# Patient Record
Sex: MALE | Race: WHITE | NOT HISPANIC OR LATINO | Employment: UNEMPLOYED | URBAN - NONMETROPOLITAN AREA
[De-identification: names, ages, dates, MRNs, and addresses within clinical notes are randomized per-mention and may not be internally consistent; named-entity substitution may affect disease eponyms.]

---

## 2017-08-23 ENCOUNTER — APPOINTMENT (OUTPATIENT)
Dept: GENERAL RADIOLOGY | Facility: HOSPITAL | Age: 37
End: 2017-08-23

## 2017-08-23 ENCOUNTER — HOSPITAL ENCOUNTER (EMERGENCY)
Facility: HOSPITAL | Age: 37
Discharge: HOME | End: 2017-08-23
Attending: EMERGENCY MEDICINE | Admitting: EMERGENCY MEDICINE

## 2017-08-23 VITALS
RESPIRATION RATE: 18 BRPM | BODY MASS INDEX: 18.96 KG/M2 | HEART RATE: 80 BPM | SYSTOLIC BLOOD PRESSURE: 146 MMHG | WEIGHT: 140 LBS | DIASTOLIC BLOOD PRESSURE: 96 MMHG | OXYGEN SATURATION: 97 % | TEMPERATURE: 98.5 F | HEIGHT: 72 IN

## 2017-08-23 DIAGNOSIS — R07.9 CHEST PAIN, UNSPECIFIED TYPE: Primary | ICD-10-CM

## 2017-08-23 DIAGNOSIS — F19.10 DRUG ABUSE (HCC): ICD-10-CM

## 2017-08-23 LAB
ALBUMIN SERPL-MCNC: 4.7 G/DL (ref 3.5–5)
ALBUMIN/GLOB SERPL: 1.5 G/DL (ref 1–2)
ALP SERPL-CCNC: 132 U/L (ref 38–126)
ALT SERPL W P-5'-P-CCNC: 36 U/L (ref 13–69)
AMPHET+METHAMPHET UR QL: POSITIVE
AMPHETAMINES UR QL: NEGATIVE
ANION GAP SERPL CALCULATED.3IONS-SCNC: 16.5 MMOL/L
AST SERPL-CCNC: 24 U/L (ref 15–46)
BARBITURATES UR QL SCN: NEGATIVE
BASOPHILS # BLD AUTO: 0.1 10*3/MM3 (ref 0–0.2)
BASOPHILS NFR BLD AUTO: 1.2 % (ref 0–2.5)
BENZODIAZ UR QL SCN: NEGATIVE
BILIRUB SERPL-MCNC: 0.6 MG/DL (ref 0.2–1.3)
BILIRUB UR QL STRIP: NEGATIVE
BUN BLD-MCNC: 10 MG/DL (ref 7–20)
BUN/CREAT SERPL: 12.5 (ref 6.3–21.9)
BUPRENORPHINE SERPL-MCNC: NEGATIVE NG/ML
CALCIUM SPEC-SCNC: 9.6 MG/DL (ref 8.4–10.2)
CANNABINOIDS SERPL QL: NEGATIVE
CHLORIDE SERPL-SCNC: 104 MMOL/L (ref 98–107)
CLARITY UR: ABNORMAL
CO2 SERPL-SCNC: 28 MMOL/L (ref 26–30)
COCAINE UR QL: NEGATIVE
COLOR UR: YELLOW
CREAT BLD-MCNC: 0.8 MG/DL (ref 0.6–1.3)
DEPRECATED RDW RBC AUTO: 38.2 FL (ref 37–54)
EOSINOPHIL # BLD AUTO: 0.18 10*3/MM3 (ref 0–0.7)
EOSINOPHIL NFR BLD AUTO: 2.1 % (ref 0–7)
ERYTHROCYTE [DISTWIDTH] IN BLOOD BY AUTOMATED COUNT: 12.6 % (ref 11.5–14.5)
GFR SERPL CREATININE-BSD FRML MDRD: 109 ML/MIN/1.73
GLOBULIN UR ELPH-MCNC: 3.2 GM/DL
GLUCOSE BLD-MCNC: 118 MG/DL (ref 74–98)
GLUCOSE UR STRIP-MCNC: NEGATIVE MG/DL
HCT VFR BLD AUTO: 48.8 % (ref 42–52)
HGB BLD-MCNC: 16.9 G/DL (ref 14–18)
HGB UR QL STRIP.AUTO: NEGATIVE
HOLD SPECIMEN: NORMAL
HOLD SPECIMEN: NORMAL
IMM GRANULOCYTES # BLD: 0.02 10*3/MM3 (ref 0–0.06)
IMM GRANULOCYTES NFR BLD: 0.2 % (ref 0–0.6)
KETONES UR QL STRIP: NEGATIVE
LEUKOCYTE ESTERASE UR QL STRIP.AUTO: NEGATIVE
LIPASE SERPL-CCNC: 153 U/L (ref 23–300)
LYMPHOCYTES # BLD AUTO: 1.93 10*3/MM3 (ref 0.6–3.4)
LYMPHOCYTES NFR BLD AUTO: 22.5 % (ref 10–50)
MCH RBC QN AUTO: 28.8 PG (ref 27–31)
MCHC RBC AUTO-ENTMCNC: 34.6 G/DL (ref 30–37)
MCV RBC AUTO: 83.3 FL (ref 80–94)
METHADONE UR QL SCN: NEGATIVE
MONOCYTES # BLD AUTO: 0.49 10*3/MM3 (ref 0–0.9)
MONOCYTES NFR BLD AUTO: 5.7 % (ref 0–12)
NEUTROPHILS # BLD AUTO: 5.87 10*3/MM3 (ref 2–6.9)
NEUTROPHILS NFR BLD AUTO: 68.3 % (ref 37–80)
NITRITE UR QL STRIP: NEGATIVE
NRBC BLD MANUAL-RTO: 0 /100 WBC (ref 0–0)
OPIATES UR QL: NEGATIVE
OXYCODONE UR QL SCN: NEGATIVE
PCP UR QL SCN: NEGATIVE
PH UR STRIP.AUTO: 7.5 [PH] (ref 5–8)
PLATELET # BLD AUTO: 336 10*3/MM3 (ref 130–400)
PMV BLD AUTO: 9.6 FL (ref 6–12)
POTASSIUM BLD-SCNC: 3.5 MMOL/L (ref 3.5–5.1)
PROPOXYPH UR QL: NEGATIVE
PROT SERPL-MCNC: 7.9 G/DL (ref 6.3–8.2)
PROT UR QL STRIP: ABNORMAL
RBC # BLD AUTO: 5.86 10*6/MM3 (ref 4.7–6.1)
SODIUM BLD-SCNC: 145 MMOL/L (ref 137–145)
SP GR UR STRIP: 1.01 (ref 1–1.03)
TRICYCLICS UR QL SCN: NEGATIVE
TROPONIN I SERPL-MCNC: 0 NG/ML (ref 0–0.05)
TROPONIN I SERPL-MCNC: <0.012 NG/ML (ref 0–0.03)
UROBILINOGEN UR QL STRIP: ABNORMAL
WBC NRBC COR # BLD: 8.59 10*3/MM3 (ref 4.8–10.8)
WHOLE BLOOD HOLD SPECIMEN: NORMAL

## 2017-08-23 PROCEDURE — 85025 COMPLETE CBC W/AUTO DIFF WBC: CPT | Performed by: EMERGENCY MEDICINE

## 2017-08-23 PROCEDURE — 99284 EMERGENCY DEPT VISIT MOD MDM: CPT

## 2017-08-23 PROCEDURE — 71010 HC CHEST PA OR AP: CPT

## 2017-08-23 PROCEDURE — 83690 ASSAY OF LIPASE: CPT | Performed by: EMERGENCY MEDICINE

## 2017-08-23 PROCEDURE — 93005 ELECTROCARDIOGRAM TRACING: CPT

## 2017-08-23 PROCEDURE — 80053 COMPREHEN METABOLIC PANEL: CPT | Performed by: EMERGENCY MEDICINE

## 2017-08-23 PROCEDURE — 80074 ACUTE HEPATITIS PANEL: CPT | Performed by: EMERGENCY MEDICINE

## 2017-08-23 PROCEDURE — 84484 ASSAY OF TROPONIN QUANT: CPT | Performed by: EMERGENCY MEDICINE

## 2017-08-23 PROCEDURE — 81003 URINALYSIS AUTO W/O SCOPE: CPT | Performed by: EMERGENCY MEDICINE

## 2017-08-23 PROCEDURE — 80306 DRUG TEST PRSMV INSTRMNT: CPT | Performed by: EMERGENCY MEDICINE

## 2017-08-23 RX ORDER — ASPIRIN 325 MG
325 TABLET ORAL ONCE
Status: COMPLETED | OUTPATIENT
Start: 2017-08-23 | End: 2017-08-23

## 2017-08-23 RX ORDER — SODIUM CHLORIDE 0.9 % (FLUSH) 0.9 %
10 SYRINGE (ML) INJECTION AS NEEDED
Status: DISCONTINUED | OUTPATIENT
Start: 2017-08-23 | End: 2017-08-23 | Stop reason: HOSPADM

## 2017-08-23 RX ORDER — ASPIRIN 81 MG/1
81 TABLET ORAL DAILY
Qty: 30 TABLET | Refills: 0 | Status: SHIPPED | OUTPATIENT
Start: 2017-08-23 | End: 2017-09-22

## 2017-08-23 RX ADMIN — ASPIRIN 325 MG ORAL TABLET 325 MG: 325 PILL ORAL at 13:02

## 2017-08-23 NOTE — ED PROVIDER NOTES
Subjective   History of Present Illness  TRIAGE CHIEF COMPLAINT:   Chief Complaint   Patient presents with   • Chest Pain   • Abdominal Pain         HPI: Mo Miller   is a 37 y.o. male   who presents to the emergency department complaining of chest pain.  Very poor historian.  Patient describes intermittent episodes of left-sided chest pain described as pressure ongoing for the past few months.  Also describes intermittent episodes of right flank discomfort for roughly the same period of time.  He does not believe they are related.  Denies any significant past medical history but his significant other states that he does a lot of drugs especially methamphetamine and also some heroin.  She also states that he was recently using methamphetamine with her son who then wound up in the hospital in the ICU now only has 30% cardiac function and is wearing a life vest at this time pending AICD placement.  Patient states this event may have nervous about his heart and decided to come in and get checked out.  Does not currently have a PCP and denies any known medical conditions.  Currently feeling well with some residual left-sided crampy discomfort.  Denies shortness of breath, nausea, vomiting, diaphoresis, dysuria, hematuria.  Patient also requests testing for hepatitis C.           Review of Systems   All other systems reviewed and are negative.      History reviewed. No pertinent past medical history.    Allergies   Allergen Reactions   • Penicillins Other (See Comments)     Pt unsure of reaction       History reviewed. No pertinent surgical history.    History reviewed. No pertinent family history.    Social History     Social History   • Marital status: Single     Spouse name: N/A   • Number of children: N/A   • Years of education: N/A     Social History Main Topics   • Smoking status: Current Every Day Smoker     Packs/day: 1.50     Types: Cigarettes   • Smokeless tobacco: None   • Alcohol use No   • Drug  use: Yes     Special: IV, Methamphetamines      Comment: heroin and meth   • Sexual activity: Defer     Other Topics Concern   • None     Social History Narrative   • None           Objective   Physical Exam    CONSTITUTIONAL: Awake, oriented, appears thin, older than stated age but non-toxic   HENT: Atraumatic, normocephalic, oral mucosa pink and moist, airway patent. Nares patent without drainage. External ears normal.   EYES: Conjunctiva clear, EOMI, PERRL   NECK: Trachea midline, non-tender, supple   CARDIOVASCULAR: Normal heart rate, Normal rhythm, No murmurs, rubs, gallops   PULMONARY/CHEST: Clear to auscultation, no rhonchi, wheezes, or rales. Symmetrical breath sounds. Non-tender.   ABDOMINAL: Non-distended, soft, non-tender - no rebound or guarding. BS normal.   NEUROLOGIC: Non-focal, moving all four extremities, no gross sensory or motor deficits.   EXTREMITIES: No clubbing, cyanosis, or edema   SKIN: Warm, Dry, No erythema, No rash     XR Chest 1 View   Final Result   No acute abnormality of the chest and no significant   interval change from the prior exam.       This report was finalized on 8/23/2017 1:24 PM by Henry Baker MD.              EKG:  NSR, rate 80, no acute ST changes        Procedures         ED Course  ED Course          ED COURSE / MEDICAL DECISION MAKING:   Nursing notes reviewed.    Patient feeling better and reevaluation.  Symptoms are atypical and workup is unremarkable.  Plan for low-dose aspirin, cardiology referral, encouraged to quit using methamphetamine and other drugs.  Patient requested testing for hepatitis and was advised these results will be available in a few days and that his PCP would need to follow up on those results.    DECISION TO DISCHARGE/ADMIT: see ED care timeline       Electronically signed by: Tyson Archuleta MD, 8/23/2017 3:28 PM              [facility]    Final diagnoses:   Chest pain, unspecified type   Drug abuse            Tyson Archuleta MD  08/23/17  [id number]

## 2017-08-24 LAB
HAV IGM SERPL QL IA: NEGATIVE
HBV CORE IGM SERPL QL IA: NEGATIVE
HBV SURFACE AG SERPL QL IA: NEGATIVE
HCV AB S/CO SERPL IA: >11 S/CO RATIO (ref 0–0.9)

## 2017-08-24 NOTE — ED NOTES
Pt comes to ED, states he was called and to to come for an abnormal lab. ARPITA Bunch states she didn't need pt to come to ED, she just wanted to give him results that he was Hep C +. I spoke with pt and gave him a list of PCP's in town for him to follow up for long term care and monitoring.     Jhon Carver RN  08/24/17 [id number]

## 2022-10-18 ENCOUNTER — HOSPITAL ENCOUNTER (INPATIENT)
Facility: HOSPITAL | Age: 42
LOS: 3 days | Discharge: HOME | End: 2022-10-21
Attending: PSYCHIATRY & NEUROLOGY | Admitting: PSYCHIATRY & NEUROLOGY

## 2022-10-18 ENCOUNTER — HOSPITAL ENCOUNTER (EMERGENCY)
Facility: HOSPITAL | Age: 42
Discharge: PSYCHIATRIC OR SUBSTANCE USE FACILITY | End: 2022-10-18
Attending: STUDENT IN AN ORGANIZED HEALTH CARE EDUCATION/TRAINING PROGRAM | Admitting: STUDENT IN AN ORGANIZED HEALTH CARE EDUCATION/TRAINING PROGRAM

## 2022-10-18 VITALS
BODY MASS INDEX: 21.25 KG/M2 | DIASTOLIC BLOOD PRESSURE: 98 MMHG | OXYGEN SATURATION: 99 % | HEIGHT: 77 IN | TEMPERATURE: 97.8 F | RESPIRATION RATE: 18 BRPM | HEART RATE: 103 BPM | WEIGHT: 180 LBS | SYSTOLIC BLOOD PRESSURE: 158 MMHG

## 2022-10-18 DIAGNOSIS — F29 PSYCHOSIS, UNSPECIFIED PSYCHOSIS TYPE: Primary | ICD-10-CM

## 2022-10-18 DIAGNOSIS — F19.10 SUBSTANCE ABUSE: ICD-10-CM

## 2022-10-18 PROBLEM — R41.82 ALTERED MENTAL STATUS: Status: ACTIVE | Noted: 2022-10-18

## 2022-10-18 LAB
ALBUMIN SERPL-MCNC: 4.74 G/DL (ref 3.5–5.2)
ALBUMIN/GLOB SERPL: 1.6 G/DL
ALP SERPL-CCNC: 161 U/L (ref 39–117)
ALT SERPL W P-5'-P-CCNC: 38 U/L (ref 1–41)
AMPHET+METHAMPHET UR QL: POSITIVE
AMPHETAMINES UR QL: POSITIVE
ANION GAP SERPL CALCULATED.3IONS-SCNC: 15 MMOL/L (ref 5–15)
AST SERPL-CCNC: 108 U/L (ref 1–40)
BACTERIA UR QL AUTO: ABNORMAL /HPF
BARBITURATES UR QL SCN: NEGATIVE
BASOPHILS # BLD AUTO: 0.11 10*3/MM3 (ref 0–0.2)
BASOPHILS NFR BLD AUTO: 0.7 % (ref 0–1.5)
BENZODIAZ UR QL SCN: NEGATIVE
BILIRUB SERPL-MCNC: 0.8 MG/DL (ref 0–1.2)
BILIRUB UR QL STRIP: ABNORMAL
BUN SERPL-MCNC: 17 MG/DL (ref 6–20)
BUN/CREAT SERPL: 16.8 (ref 7–25)
BUPRENORPHINE SERPL-MCNC: NEGATIVE NG/ML
CALCIUM SPEC-SCNC: 9.5 MG/DL (ref 8.6–10.5)
CANNABINOIDS SERPL QL: NEGATIVE
CHLORIDE SERPL-SCNC: 98 MMOL/L (ref 98–107)
CLARITY UR: CLEAR
CO2 SERPL-SCNC: 22 MMOL/L (ref 22–29)
COCAINE UR QL: NEGATIVE
COLOR UR: ABNORMAL
CREAT SERPL-MCNC: 1.01 MG/DL (ref 0.76–1.27)
DEPRECATED RDW RBC AUTO: 37.6 FL (ref 37–54)
EGFRCR SERPLBLD CKD-EPI 2021: 95.8 ML/MIN/1.73
EOSINOPHIL # BLD AUTO: 0.01 10*3/MM3 (ref 0–0.4)
EOSINOPHIL NFR BLD AUTO: 0.1 % (ref 0.3–6.2)
ERYTHROCYTE [DISTWIDTH] IN BLOOD BY AUTOMATED COUNT: 12.5 % (ref 12.3–15.4)
ETHANOL BLD-MCNC: <10 MG/DL (ref 0–10)
ETHANOL UR QL: <0.01 %
FLUAV RNA RESP QL NAA+PROBE: NOT DETECTED
FLUBV RNA RESP QL NAA+PROBE: NOT DETECTED
GLOBULIN UR ELPH-MCNC: 3 GM/DL
GLUCOSE SERPL-MCNC: 121 MG/DL (ref 65–99)
GLUCOSE UR STRIP-MCNC: NEGATIVE MG/DL
HCT VFR BLD AUTO: 50.7 % (ref 37.5–51)
HGB BLD-MCNC: 17.6 G/DL (ref 13–17.7)
HGB UR QL STRIP.AUTO: ABNORMAL
HYALINE CASTS UR QL AUTO: ABNORMAL /LPF
IMM GRANULOCYTES # BLD AUTO: 0.07 10*3/MM3 (ref 0–0.05)
IMM GRANULOCYTES NFR BLD AUTO: 0.5 % (ref 0–0.5)
KETONES UR QL STRIP: ABNORMAL
LEUKOCYTE ESTERASE UR QL STRIP.AUTO: ABNORMAL
LYMPHOCYTES # BLD AUTO: 2.07 10*3/MM3 (ref 0.7–3.1)
LYMPHOCYTES NFR BLD AUTO: 14 % (ref 19.6–45.3)
MAGNESIUM SERPL-MCNC: 1.9 MG/DL (ref 1.6–2.6)
MCH RBC QN AUTO: 28.8 PG (ref 26.6–33)
MCHC RBC AUTO-ENTMCNC: 34.7 G/DL (ref 31.5–35.7)
MCV RBC AUTO: 82.8 FL (ref 79–97)
METHADONE UR QL SCN: NEGATIVE
MONOCYTES # BLD AUTO: 0.97 10*3/MM3 (ref 0.1–0.9)
MONOCYTES NFR BLD AUTO: 6.5 % (ref 5–12)
NEUTROPHILS NFR BLD AUTO: 11.6 10*3/MM3 (ref 1.7–7)
NEUTROPHILS NFR BLD AUTO: 78.2 % (ref 42.7–76)
NITRITE UR QL STRIP: NEGATIVE
NRBC BLD AUTO-RTO: 0 /100 WBC (ref 0–0.2)
OPIATES UR QL: NEGATIVE
OXYCODONE UR QL SCN: NEGATIVE
PCP UR QL SCN: NEGATIVE
PH UR STRIP.AUTO: <=5 [PH] (ref 5–8)
PLATELET # BLD AUTO: 413 10*3/MM3 (ref 140–450)
PMV BLD AUTO: 8.6 FL (ref 6–12)
POTASSIUM SERPL-SCNC: 4 MMOL/L (ref 3.5–5.2)
PROPOXYPH UR QL: NEGATIVE
PROT SERPL-MCNC: 7.7 G/DL (ref 6–8.5)
PROT UR QL STRIP: ABNORMAL
RBC # BLD AUTO: 6.12 10*6/MM3 (ref 4.14–5.8)
RBC # UR STRIP: ABNORMAL /HPF
REF LAB TEST METHOD: ABNORMAL
SARS-COV-2 RNA RESP QL NAA+PROBE: NOT DETECTED
SODIUM SERPL-SCNC: 135 MMOL/L (ref 136–145)
SP GR UR STRIP: >1.03 (ref 1–1.03)
SQUAMOUS #/AREA URNS HPF: ABNORMAL /HPF
TRICYCLICS UR QL SCN: NEGATIVE
UROBILINOGEN UR QL STRIP: ABNORMAL
WBC # UR STRIP: ABNORMAL /HPF
WBC NRBC COR # BLD: 14.83 10*3/MM3 (ref 3.4–10.8)

## 2022-10-18 PROCEDURE — C9803 HOPD COVID-19 SPEC COLLECT: HCPCS

## 2022-10-18 PROCEDURE — 25010000002 LORAZEPAM PER 2 MG: Performed by: STUDENT IN AN ORGANIZED HEALTH CARE EDUCATION/TRAINING PROGRAM

## 2022-10-18 PROCEDURE — 85025 COMPLETE CBC W/AUTO DIFF WBC: CPT | Performed by: EMERGENCY MEDICINE

## 2022-10-18 PROCEDURE — 83735 ASSAY OF MAGNESIUM: CPT | Performed by: EMERGENCY MEDICINE

## 2022-10-18 PROCEDURE — 36415 COLL VENOUS BLD VENIPUNCTURE: CPT

## 2022-10-18 PROCEDURE — 80053 COMPREHEN METABOLIC PANEL: CPT | Performed by: EMERGENCY MEDICINE

## 2022-10-18 PROCEDURE — 81001 URINALYSIS AUTO W/SCOPE: CPT | Performed by: EMERGENCY MEDICINE

## 2022-10-18 PROCEDURE — 82077 ASSAY SPEC XCP UR&BREATH IA: CPT | Performed by: EMERGENCY MEDICINE

## 2022-10-18 PROCEDURE — 25010000002 DIPHENHYDRAMINE PER 50 MG: Performed by: PHYSICIAN ASSISTANT

## 2022-10-18 PROCEDURE — 96372 THER/PROPH/DIAG INJ SC/IM: CPT

## 2022-10-18 PROCEDURE — 87636 SARSCOV2 & INF A&B AMP PRB: CPT | Performed by: EMERGENCY MEDICINE

## 2022-10-18 PROCEDURE — 80306 DRUG TEST PRSMV INSTRMNT: CPT | Performed by: EMERGENCY MEDICINE

## 2022-10-18 PROCEDURE — 99284 EMERGENCY DEPT VISIT MOD MDM: CPT

## 2022-10-18 PROCEDURE — 25010000002 HALOPERIDOL LACTATE PER 5 MG: Performed by: PHYSICIAN ASSISTANT

## 2022-10-18 RX ORDER — TRAZODONE HYDROCHLORIDE 50 MG/1
50 TABLET ORAL NIGHTLY PRN
Status: DISCONTINUED | OUTPATIENT
Start: 2022-10-18 | End: 2022-10-21 | Stop reason: HOSPADM

## 2022-10-18 RX ORDER — BENZONATATE 100 MG/1
100 CAPSULE ORAL 3 TIMES DAILY PRN
Status: DISCONTINUED | OUTPATIENT
Start: 2022-10-18 | End: 2022-10-21 | Stop reason: HOSPADM

## 2022-10-18 RX ORDER — NICOTINE 21 MG/24HR
1 PATCH, TRANSDERMAL 24 HOURS TRANSDERMAL
Status: DISCONTINUED | OUTPATIENT
Start: 2022-10-18 | End: 2022-10-21 | Stop reason: HOSPADM

## 2022-10-18 RX ORDER — OLANZAPINE 5 MG/1
10 TABLET, ORALLY DISINTEGRATING ORAL NIGHTLY
Status: ACTIVE | OUTPATIENT
Start: 2022-10-18 | End: 2022-10-19

## 2022-10-18 RX ORDER — HYDROXYZINE 50 MG/1
50 TABLET, FILM COATED ORAL EVERY 6 HOURS PRN
Status: DISCONTINUED | OUTPATIENT
Start: 2022-10-18 | End: 2022-10-21 | Stop reason: HOSPADM

## 2022-10-18 RX ORDER — ONDANSETRON 4 MG/1
4 TABLET, FILM COATED ORAL EVERY 6 HOURS PRN
Status: DISCONTINUED | OUTPATIENT
Start: 2022-10-18 | End: 2022-10-21 | Stop reason: HOSPADM

## 2022-10-18 RX ORDER — LORAZEPAM 2 MG/ML
2 INJECTION INTRAMUSCULAR ONCE
Status: COMPLETED | OUTPATIENT
Start: 2022-10-18 | End: 2022-10-18

## 2022-10-18 RX ORDER — BENZTROPINE MESYLATE 1 MG/ML
1 INJECTION INTRAMUSCULAR; INTRAVENOUS ONCE AS NEEDED
Status: DISCONTINUED | OUTPATIENT
Start: 2022-10-18 | End: 2022-10-21 | Stop reason: HOSPADM

## 2022-10-18 RX ORDER — BENZTROPINE MESYLATE 1 MG/1
2 TABLET ORAL ONCE AS NEEDED
Status: DISCONTINUED | OUTPATIENT
Start: 2022-10-18 | End: 2022-10-21 | Stop reason: HOSPADM

## 2022-10-18 RX ORDER — IBUPROFEN 400 MG/1
400 TABLET ORAL EVERY 6 HOURS PRN
Status: DISCONTINUED | OUTPATIENT
Start: 2022-10-18 | End: 2022-10-21 | Stop reason: HOSPADM

## 2022-10-18 RX ORDER — DIPHENHYDRAMINE HYDROCHLORIDE 50 MG/ML
50 INJECTION INTRAMUSCULAR; INTRAVENOUS ONCE
Status: COMPLETED | OUTPATIENT
Start: 2022-10-18 | End: 2022-10-18

## 2022-10-18 RX ORDER — ALUMINA, MAGNESIA, AND SIMETHICONE 2400; 2400; 240 MG/30ML; MG/30ML; MG/30ML
15 SUSPENSION ORAL EVERY 6 HOURS PRN
Status: DISCONTINUED | OUTPATIENT
Start: 2022-10-18 | End: 2022-10-21 | Stop reason: HOSPADM

## 2022-10-18 RX ORDER — ECHINACEA PURPUREA EXTRACT 125 MG
2 TABLET ORAL AS NEEDED
Status: DISCONTINUED | OUTPATIENT
Start: 2022-10-18 | End: 2022-10-21 | Stop reason: HOSPADM

## 2022-10-18 RX ORDER — FAMOTIDINE 20 MG/1
20 TABLET, FILM COATED ORAL 2 TIMES DAILY PRN
Status: DISCONTINUED | OUTPATIENT
Start: 2022-10-18 | End: 2022-10-21 | Stop reason: HOSPADM

## 2022-10-18 RX ORDER — LOPERAMIDE HYDROCHLORIDE 2 MG/1
2 CAPSULE ORAL
Status: DISCONTINUED | OUTPATIENT
Start: 2022-10-18 | End: 2022-10-21 | Stop reason: HOSPADM

## 2022-10-18 RX ORDER — HALOPERIDOL 5 MG/ML
5 INJECTION INTRAMUSCULAR ONCE
Status: COMPLETED | OUTPATIENT
Start: 2022-10-18 | End: 2022-10-18

## 2022-10-18 RX ADMIN — DIPHENHYDRAMINE HYDROCHLORIDE 50 MG: 50 INJECTION, SOLUTION INTRAMUSCULAR; INTRAVENOUS at 18:18

## 2022-10-18 RX ADMIN — LORAZEPAM 2 MG: 2 INJECTION INTRAMUSCULAR; INTRAVENOUS at 18:18

## 2022-10-18 RX ADMIN — HALOPERIDOL LACTATE 5 MG: 5 INJECTION, SOLUTION INTRAMUSCULAR at 18:18

## 2022-10-18 NOTE — NURSING NOTE
Spoke with Dr. Fuller, discussed assessment and labs, new orders to admit the patient to A&E on a 72 hour hold once labs are resulted and acceptable per protocol.  Request a B52 now while in the ED, Zyprexa 10 mg PO tonight at bedtime.  [id number]
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
## 2022-10-18 NOTE — NURSING NOTE
"Pt presents to intake with sister, Veda Miller, [phone].      Pt states he is here because \"he is high\", states he snorted a line today.  Pt denies any other substance abuse, pt stands up during the assessment and states \"I dont need admitted, I just need this meth to wear off\". Pt denies wanting detox or needing admission. Pt also denies having an addiction to any substance at this time. Pt has moments of clarity and moments of confusion, as well as moments of paranoia.     Sister states the patient has been using Meth and has not slept for 4 nights and has not eaten or drank anything to her knowledge.  Pt presents in a stupor and is confused, is disoriented to time and situation, but is oriented to person and place.     Pt denies SI/HI    Pt states he is hearing voices but doesn't respond when asked about other hallucinations.    Pt rates depression and anxiety 0/10 at this time  "
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
## 2022-10-19 PROBLEM — F15.20 METHAMPHETAMINE USE DISORDER, SEVERE: Status: ACTIVE | Noted: 2022-10-19

## 2022-10-19 PROBLEM — F19.959 PSYCHOACTIVE SUBSTANCE-INDUCED PSYCHOSIS: Status: ACTIVE | Noted: 2022-10-18

## 2022-10-19 PROBLEM — F17.200 NICOTINE USE DISORDER: Status: ACTIVE | Noted: 2022-10-19

## 2022-10-19 LAB
QT INTERVAL: 370 MS
QTC INTERVAL: 424 MS

## 2022-10-19 PROCEDURE — 99223 1ST HOSP IP/OBS HIGH 75: CPT | Performed by: PSYCHIATRY & NEUROLOGY

## 2022-10-19 PROCEDURE — 93010 ELECTROCARDIOGRAM REPORT: CPT | Performed by: INTERNAL MEDICINE

## 2022-10-19 PROCEDURE — 93005 ELECTROCARDIOGRAM TRACING: CPT | Performed by: PSYCHIATRY & NEUROLOGY

## 2022-10-20 PROCEDURE — 99232 SBSQ HOSP IP/OBS MODERATE 35: CPT | Performed by: PSYCHIATRY & NEUROLOGY

## 2022-10-21 VITALS
SYSTOLIC BLOOD PRESSURE: 135 MMHG | WEIGHT: 180 LBS | HEIGHT: 72 IN | RESPIRATION RATE: 18 BRPM | BODY MASS INDEX: 24.38 KG/M2 | TEMPERATURE: 97.3 F | HEART RATE: 65 BPM | OXYGEN SATURATION: 98 % | DIASTOLIC BLOOD PRESSURE: 88 MMHG

## 2022-10-21 PROCEDURE — 99238 HOSP IP/OBS DSCHRG MGMT 30/<: CPT | Performed by: PSYCHIATRY & NEUROLOGY

## 2023-02-01 ENCOUNTER — OFFICE VISIT (OUTPATIENT)
Dept: PSYCHIATRY | Facility: CLINIC | Age: 43
End: 2023-02-01
Payer: COMMERCIAL

## 2023-02-01 VITALS
SYSTOLIC BLOOD PRESSURE: 144 MMHG | WEIGHT: 165 LBS | HEART RATE: 87 BPM | HEIGHT: 72 IN | BODY MASS INDEX: 22.35 KG/M2 | DIASTOLIC BLOOD PRESSURE: 88 MMHG

## 2023-02-01 DIAGNOSIS — F15.10 METHAMPHETAMINE USE: Primary | ICD-10-CM

## 2023-02-01 PROCEDURE — 90792 PSYCH DIAG EVAL W/MED SRVCS: CPT | Performed by: NURSE PRACTITIONER

## 2023-02-01 NOTE — PROGRESS NOTES
"Chief Complaint  Drug Problem      Subjective          Mo Miller presents to [facility] for court ordered initial evaluation.    History of Present Illness: Patient reports she presents today as a referral from the [place] court system for initial evaluation.  Patient says \"I am court ordered to be here\".  He says he has a very long history of extensive substance use issues, most recently methamphetamine abuse.  He says he has participated in multiple drug rehabilitation programs.  He says he has done both inpatient and outpatient, as well as IOP programs.  Patient says \"it is hard to quit something until you are ready\".  He says today he has been ready for a couple months.  His most recent arrest was shortly before Christmas.  He says he has not used since then.  Patient says he feels he is doing much better since he has stopped using methamphetamine.  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  Patient says outside of times he has been using illicit substances, he has never struggled with any psychiatric disorders.  He denies any mood or anxiety related issues when he is sober.  Patient says when he does drugs, is when he has issues.  Patient says he is feeling good today, and has no issues or complaints.  He reports he sleeps and eats well, and denies issues with either.  Patient denies any SI/HI, A/V hallucinations.    Past Psychiatric History: Patient has 1 prior psychiatric hospitalization, at [facility] in October 2022 secondary to meth induced psychosis.  Patient reports he did not want to take any medication, and was not prescribed anything upon his release.  He denies any history of \"serious\" suicide attempts.  He does report 1 time when he was under the influence of methamphetamine he cut his wrists, but says \"it was a cry for help\".  Patient says he has never truly wanted to harm himself or " "wanted to end his life.  He denies any history of psychiatric medications.    Substance Use/Abuse: Patient has an extensive substance use history, that primarily consisted of methamphetamine abuse.  Patient reports he only did illicit drugs \"to get high\".  He says he was never masking any underlying issues or trying to self medicate any problems.  He reports he has been clean from all substances for approximately 1.5 months.  He does drink alcohol socially, but not very often.  He has a 1.5 pack/day smoker.  Patient reports he started using illicit substances around the age of 13, when he started smoking cannabis.    Past Medical/Developmental History: Patient denies any known past medical or surgical history.  He denies any known developmental delay history.  Patient admits he has not been to a medical provider in a very long time.    Family Psychiatric History: Patient denies any known family psychiatric history.    Social History: Patient is originally from [place], [place] but grew up in the foster care system in [place].  Patient says he was in foster care from first grade until his sister got custody of him when he was 18 years old.  He did not graduate high school or get a GED.  Patient reports he continued to use illicit substances after going to live with his sister, which he reports caused issues with their relationship over the years.  His parents were , but he says they are now both .  He says he was not very close with either of them growing up in foster care.  He has 9 siblings, but says they are all half siblings.  He says he is somewhat close with his older sister that had custody of him.  He currently works at [employer] as a .  He has been  once, and says they were  greater than 15 years, but that they were only together for between 4 and 5 months.  He has 2 children, an 11-year-old son and an 18-year-old daughter and says he is close with " "both of them.  He reports they both live with their mother.      Current Medications:   No current outpatient medications on file.     No current facility-administered medications for this visit.       Mental Status Exam:   Hygiene:   good  Cooperation:  Cooperative  Eye Contact:  Good  Psychomotor Behavior:  Restless  Affect:  Appropriate  Mood: euthymic  Speech:  Normal  Thought Process:  Goal directed  Thought Content:  Mood congruent  Suicidal:  None  Homicidal:  None  Hallucinations:  None  Delusion:  None  Memory:  Intact  Orientation:  Person, Place, Time and Situation  Reliability:  fair  Insight:  Good  Judgement:  Fair  Impulse Control:  Fair  Physical/Medical Issues:  No      Objective   Vital Signs:   /88   Pulse 87   Ht 182.9 cm (72\")   Wt 74.8 kg (165 lb)   BMI 22.38 kg/m²     Physical Exam  Neurological:      Mental Status: He is oriented to person, place, and time. Mental status is at baseline.      Coordination: Coordination is intact.      Gait: Gait is intact.   Psychiatric:         Behavior: Behavior is cooperative.        Result Review :                   Assessment and Plan    Diagnoses and all orders for this visit:    1. Methamphetamine use (HCC) (Primary)         PHQ-9 Score:   PHQ-9 Total Score: 4      Depression Screening:  Patient screened positive for depression based on a PHQ-9 score of  on . Follow-up recommendations include: Suicide Risk Assessment performed.        Tobacco Cessation:  Mo Miller  reports that he has been smoking cigarettes. He has been smoking an average of 1.5 packs per day. He does not have any smokeless tobacco history on file.. I have educated him on the risk of diseases from using tobacco products such as cancer, COPD and heart disease.     I advised him to quit and he is not willing to quit.    I spent 3  minutes counseling the patient.           Impression/Plan:  -This my initial interaction with the patient.  Patient presents today as a " merlene, 42-year-old, , biological male.  Patient has an extensive history of substance use, especially methamphetamine.  He reports he has been clean since his arrest shortly before Christmas, and says he believes he finally reached a point he was ready to stop.  Patient says he is here today because he was court ordered to have an evaluation.  Patient reports today that he is feeling well, and denies any mental health concerns or complaints.  He says his mood is good, and denies any dysfunction or depression.  He denies any significant anxiety.  Patient reports he is eating and sleeping well, and denies issues with either of those.  Patient has never taken a psychiatric medication in the past, and reports he does not feel he is in need of any today.  -Patient's MICHELE report reviewed and deemed appropriate.  Patient counseled on use of controlled substances.  -Reviewed available lab work.  -Advised patient he can return as needed..    MEDS ORDERED DURING VISIT:  No orders of the defined types were placed in this encounter.        Follow Up   Return if symptoms worsen or fail to improve.  Patient was given instructions and counseling regarding his condition or for health maintenance advice. Please see specific information pulled into the AVS if appropriate.       TREATMENT PLAN/GOALS: Continue supportive psychotherapy efforts and medications as indicated. Treatment and medication options discussed during today's visit. Patient acknowledged and verbally consented to continue with current treatment plan and was educated on the importance of compliance with treatment and follow-up appointments.    MEDICATION ISSUES:  Discussed medication options and treatment plan of prescribed medication as well as the risks, benefits, and side effects including potential falls, possible impaired driving and metabolic adversities among others. Patient is agreeable to call the office with any worsening of symptoms or onset  of side effects. Patient is agreeable to call 911 or go to the nearest ER should he/she begin having SI/HI.            This document has been electronically signed by ELI Merino, PMHNP-BC  February 2, 2023 19:45 EST      Part of this note may be an electronic transcription/translation of spoken language to printed text using the Dragon Dictation System.

## 2023-02-20 ENCOUNTER — OFFICE VISIT (OUTPATIENT)
Dept: PSYCHIATRY | Facility: CLINIC | Age: 43
End: 2023-02-20
Payer: COMMERCIAL

## 2023-02-20 ENCOUNTER — LAB (OUTPATIENT)
Dept: LAB | Facility: HOSPITAL | Age: 43
End: 2023-02-20
Payer: COMMERCIAL

## 2023-02-20 VITALS
SYSTOLIC BLOOD PRESSURE: 142 MMHG | HEART RATE: 68 BPM | BODY MASS INDEX: 21.94 KG/M2 | WEIGHT: 162 LBS | HEIGHT: 72 IN | DIASTOLIC BLOOD PRESSURE: 88 MMHG

## 2023-02-20 DIAGNOSIS — R44.0 AUDITORY HALLUCINATIONS: ICD-10-CM

## 2023-02-20 DIAGNOSIS — F11.21 OPIOID USE DISORDER, SEVERE, IN SUSTAINED REMISSION: ICD-10-CM

## 2023-02-20 DIAGNOSIS — F15.21 METHAMPHETAMINE USE DISORDER, SEVERE, IN EARLY REMISSION: ICD-10-CM

## 2023-02-20 DIAGNOSIS — B18.2 CHRONIC HEPATITIS C WITHOUT HEPATIC COMA: ICD-10-CM

## 2023-02-20 DIAGNOSIS — F41.1 GAD (GENERALIZED ANXIETY DISORDER): ICD-10-CM

## 2023-02-20 DIAGNOSIS — F15.21 METHAMPHETAMINE USE DISORDER, SEVERE, IN EARLY REMISSION: Primary | ICD-10-CM

## 2023-02-20 LAB
AMPHET+METHAMPHET UR QL: NEGATIVE
AMPHETAMINES UR QL: NEGATIVE
BARBITURATES UR QL SCN: NEGATIVE
BENZODIAZ UR QL SCN: NEGATIVE
BUPRENORPHINE SERPL-MCNC: NEGATIVE NG/ML
CANNABINOIDS SERPL QL: NEGATIVE
COCAINE UR QL: NEGATIVE
HIV1 P24 AG SER QL: NORMAL
HIV1+2 AB SER QL: NORMAL
METHADONE UR QL SCN: NEGATIVE
OPIATES UR QL: NEGATIVE
OXYCODONE UR QL SCN: NEGATIVE
PCP UR QL SCN: NEGATIVE
PROPOXYPH UR QL: NEGATIVE
TRICYCLICS UR QL SCN: NEGATIVE

## 2023-02-20 PROCEDURE — 80074 ACUTE HEPATITIS PANEL: CPT

## 2023-02-20 PROCEDURE — 84443 ASSAY THYROID STIM HORMONE: CPT

## 2023-02-20 PROCEDURE — 85025 COMPLETE CBC W/AUTO DIFF WBC: CPT

## 2023-02-20 PROCEDURE — 90792 PSYCH DIAG EVAL W/MED SRVCS: CPT | Performed by: NURSE PRACTITIONER

## 2023-02-20 PROCEDURE — 80053 COMPREHEN METABOLIC PANEL: CPT

## 2023-02-20 PROCEDURE — 80306 DRUG TEST PRSMV INSTRMNT: CPT

## 2023-02-20 PROCEDURE — G0475 HIV COMBINATION ASSAY: HCPCS

## 2023-02-20 PROCEDURE — 87522 HEPATITIS C REVRS TRNSCRPJ: CPT

## 2023-02-20 PROCEDURE — 36415 COLL VENOUS BLD VENIPUNCTURE: CPT

## 2023-02-20 PROCEDURE — 86592 SYPHILIS TEST NON-TREP QUAL: CPT

## 2023-02-20 RX ORDER — QUETIAPINE FUMARATE 25 MG/1
25 TABLET, FILM COATED ORAL NIGHTLY
Qty: 30 TABLET | Refills: 0 | Status: SHIPPED | OUTPATIENT
Start: 2023-02-20 | End: 2023-03-22 | Stop reason: SDUPTHER

## 2023-02-20 NOTE — PROGRESS NOTES
New Patient Office Visit        Patient Name: Mo Miller  : 1980   MRN: [id number]     Referring Provider: Provider, No Known    Chief Complaint: Substance use    History of Present Illness:   Mo Miller is a 42 y.o. male who is here today for court ordered initial evaluation with provider related to substance use.  Somewhat of a poor historian in regards to timeline.  Initial substance in middle school with alcohol and marijuana.  Marijuana use slowly increased over time.  Used daily for quite some time with no intake in 1 to 2 years.  Alcohol use is social and currently seldom.  Last intake with 1 shot on New Year's Josefa.  In high school, recreational use of opiate pain medication began.  Initially started using once or twice a month and gradually progressed to 2-3 times weekly in his 30s.  Does admit to using heroin and fentanyl on occasion with no use in of either in greater than 5 years.  Methamphetamine use began in his 30s as he tried to stop opiate use.  Use was initially 2 to 3 days a week but he found it too activating and typically used 1 or 2 times a month.  Last use 10/31/2022 after arrest for PI. Has occasional, mild cravings for marijuana typically triggered by smelling it. Reports intermittent cocaine use with last intake >10 years ago. Previous B[facility]RA treatment includes 6-month inpatient residential care in [place] (), completed 6 month IOP program at [facility] (), and detox at [facility] .  Not currently attending any meetings or engaging with sober support. Reviewed DSM-V criteria with patient and meets requirements for opioid use disorder, severe, in sustained remission and methamphetamine use disorder, severe, in early remission.     Currently lives in [place] with his sister.  Has two children (11 year old son, 18 year old daughter) that live with their mother. Has son every other weekend but admits relationship with daughter is strained due to him  "being in active addiction most of her childhood.  Identifies sober support system of his sister and children. Currently employed full-time as a  by [employer]. License is not active and does not have a vehicle. Current legal issues public intoxication with methamphetamine from 10/31/2022.  Has been on parole for drug-related charges in the past.  Motivated to change as \"I don't want this to be drawn out a long time”.     Denies any psych history. Did have recent evaluation by PMHNP in our office but admits he was not forthcoming with symptoms.  Today reports symptoms of constant restlessness, sleeping too much or too little, racing thoughts, feelings of guilt, lack of motivation, feeling overwhelmed at times, nervousness, feeling on edge, and trouble relaxing.  Reports poor appetite overall but this is baseline for him.  No recent weight loss. Has difficulty falling asleep about twice a week. Will get about 3-4 hours and does not wake feeling rested. Symptoms have been present since childhood.  Raised in foster care from 1st grade until sister gained custody when he was 18 years old. Has 9 half siblings. Reports today he has been having auditory hallucinations almost daily for the last 2 to 3 years that have worsened his anxiety tremendously.  This is the first time he has admitted it to a healthcare provider.  Voices are constant chatter that have persisted with sobriety.  Most of the time he cannot tell what they are saying. Sometimes they are critical of what he is doing but mostly it is just a running commentary on what he is doing. Denies any command or menacing hallucinations.  Denies visual hallucinations.  No prior psychiatric treatment, individual counseling (other than for BUSHRA). Has never had any pharmacological interventions for mood or psychosis. Denies prior psychiatric hospitalizations. Denies SI/H. +FH of AUD in father. +trauma hx. PMH includes chronic hep C diagnosed " several years ago. LFTs elevated 2022. No prior treatment. Agreeable to referral to Hep C clinic. Currently does not have PCP.    Triggers: smelling marijuana    Cravings: seldom for marijuana, denies cravings for any other substance    Relapse Prevention: Agreeable to recovery meetings 3 to 4 days a week encouraged, peer support, individual therapy bi-weekly with regular UDS    Urine Drug Screen (today's visit) discussed: Ordered    UDS Confirmation: N/A    MICHELE (PDMP) Reviewed for Current/Active Medications: No active medication    DSM 5 Substance Use Disorder Checklist     Diagnostic Criteria   (Substance use disorder requires at least 2 criteria be met within 12 month period)   Meets Criteria          Yes / No  Notes/Supporting Information    1. Substance often taken in larger amounts or over a longer period than intended.  yes  opiates and methamphetamine   2.  There is a persistent desire or unsuccessful effort to cut        down or control th substance use.  no    3.  A great deal of time is spent in activities necessary to        obtain the substance, use the substance, or recover        from its effects.  yes  opiates and methamphetamine   4.  Craving or a strong desire to use the substance.  yes  opiates and methamphetamine   5.  Recurrent substance use resulting in failure to fulfill        major role obligations at work, school, or home.  yes  opiates and methamphetamine   6.  Continued substance use despite having persistent or         recurrent social or interpersonal problems caused or         exacerbated by the effects of the substance.  yes  opiates and methamphetamine   7.   Important social, occupational or recreational activities        are given up or reduced because of substance use.  yes  opiates and methamphetamine   8.   Recurrent substance use in situations in which it is        physically hazardous.  yes  opiates and methamphetamine   9.  Continued use despite knowledge of having a          persistent or recurrent physical or psychological         problem that is likely to have been caused or        exacerbated by the substance.  yes  opiates and methamphetamine   10. * Tolerance, as defined by either of the following:          a. A need for markedly increased amounts of the              Substance to achieve intoxication or desired effect.          b. Markedly diminished effect with continued use of              The same substance.  yes  opiates only   11.  * Withdrawal, as manifested by either of the following:         a. The characteristic withdrawal for the substance.          b. The same (or closely related) substance is taken to               Relieve or avoid withdrawal symptoms.  yes  opiates only   **This criterion is not considered to be met for those individuals taking prescriptions opiates solely under medical supervision. **   Severity: Mild: 2-3 symptoms, Moderate: 4-5 symptoms, Severe: 6 or more symptoms.          Past Surgical History:  History reviewed. No pertinent surgical history.    Problem List:  Patient Active Problem List   Diagnosis   • Psychoactive substance-induced psychosis (HCC)   • Methamphetamine use disorder, severe (HCC)   • Nicotine use disorder       Allergy:   Allergies   Allergen Reactions   • Penicillins Other (See Comments)     Pt unsure of reaction        Current Medications:   Current Outpatient Medications   Medication Sig Dispense Refill   • QUEtiapine (SEROquel) 25 MG tablet Take 1 tablet by mouth Every Night. 30 tablet 0     No current facility-administered medications for this visit.       Past Medical History:  Past Medical History:   Diagnosis Date   • Psychosis (HCC)    • Substance abuse (HCC)        Social History:  Social History     Socioeconomic History   • Marital status: Single   Tobacco Use   • Smoking status: Every Day     Packs/day: 1.00     Types: Cigarettes   Vaping Use   • Vaping Use: Never used   Substance and Sexual Activity   • Alcohol  use: Yes     Comment: rarely   • Drug use: Not Currently     Types: IV, Methamphetamines     Comment: states its been 4-5 months heroin and meth   • Sexual activity: Defer     Partners: Female       Family History:  History reviewed. No pertinent family history.      Subjective      Review of Systems:   Review of Systems   Constitutional: Positive for fatigue. Negative for chills and fever.   Respiratory: Negative for shortness of breath.    Cardiovascular: Negative for chest pain.   Gastrointestinal: Negative for abdominal pain.   Skin: Negative for skin lesions.   Neurological: Negative for seizures and confusion.   Psychiatric/Behavioral: Positive for hallucinations, sleep disturbance, depressed mood and stress. Negative for suicidal ideas. The patient is nervous/anxious.        PHQ-9 Depression Screening  Little interest or pleasure in doing things? 0-->not at all   Feeling down, depressed, or hopeless? 0-->not at all   Trouble falling or staying asleep, or sleeping too much? 1-->several days   Feeling tired or having little energy? 1-->several days   Poor appetite or overeating? 0-->not at all   Feeling bad about yourself - or that you are a failure or have let yourself or your family down? 0-->not at all   Trouble concentrating on things, such as reading the newspaper or watching television? 0-->not at all   Moving or speaking so slowly that other people could have noticed? Or the opposite - being so fidgety or restless that you have been moving around a lot more than usual? 0-->not at all   Thoughts that you would be better off dead, or of hurting yourself in some way? 0-->not at all   PHQ-9 Total Score 2   If you checked off any problems, how difficult have these problems made it for you to do your work, take care of things at home, or get along with other people? somewhat difficult     HUGO-7 Score:   Feeling nervous, anxious or on edge: Several days  Not being able to stop or control worrying: Not at  all  Worrying too much about different things: Not at all  Trouble Relaxing: Several days  Being so restless that it is hard to sit still: Not at all  Feeling afraid as if something awful might happen: Not at all  Becoming easily annoyed or irritable: Several days  HUGO 7 Total Score: 3  If you checked any problems, how difficult have these problems made it for you to do your work, take care of things at home, or get along with other people: Somewhat difficult    Patient History:   The following portions of the patient's history were reviewed and updated as appropriate: allergies, current medications, past family history, past medical history, past social history, past surgical history and problem list.     Social:  Social History     Socioeconomic History   • Marital status: Single   Tobacco Use   • Smoking status: Every Day     Packs/day: 1.00     Types: Cigarettes   Vaping Use   • Vaping Use: Never used   Substance and Sexual Activity   • Alcohol use: Yes     Comment: rarely   • Drug use: Not Currently     Types: IV, Methamphetamines     Comment: states its been 4-5 months heroin and meth   • Sexual activity: Defer     Partners: Female       Medications:     Current Outpatient Medications:   •  QUEtiapine (SEROquel) 25 MG tablet, Take 1 tablet by mouth Every Night., Disp: 30 tablet, Rfl: 0    Objective     Physical Exam:  Physical Exam  Vitals reviewed.   Constitutional:       General: He is not in acute distress.     Appearance: He is well-developed. He is not ill-appearing.   Eyes:      General: No scleral icterus.  Pulmonary:      Effort: No respiratory distress.   Neurological:      Mental Status: He is alert and oriented to person, place, and time.      Gait: Gait normal.   Psychiatric:         Attention and Perception: Attention normal.         Mood and Affect: Mood and affect normal.         Speech: Speech normal.         Behavior: Behavior normal. Behavior is cooperative.         Thought Content: Thought  "content is not paranoid or delusional. Thought content does not include homicidal or suicidal ideation. Thought content does not include homicidal or suicidal plan.         Cognition and Memory: Cognition and memory normal.         Judgment: Judgment normal.         Vital Signs:   Vitals:    02/20/23 1519   BP: 142/88   Pulse: 68   Weight: 73.5 kg (162 lb)   Height: 182.9 cm (72\")     Body mass index is 21.97 kg/m².     Mental Status Exam:   Hygiene:   good  Cooperation:  Cooperative  Eye Contact:  Good  Psychomotor Behavior:  Restless  Affect:  Full range  Mood: anxious  Speech:  Normal  Thought Process:  Goal directed  Thought Content:  Mood congruent  Suicidal:  None  Homicidal:  None  Hallucinations:  Auditory  Delusion:  None  Memory:  Intact  Orientation:  Person, Place, Time and Situation  Reliability:  Good  Insight:  Good  Judgement:  Fair  Impulse Control:  Fair    Assessment / Plan      Assessment & Plan         -Patient agreeable to start pharmacological intervention for mood and auditory hallucinations.  He has not been on any medications in the past.  Did take one of his sisters 200 mg Seroquel once in the past that caused restless legs.  We discussed medication options today and he is agreeable to try Seroquel again as it will be helpful for anxiety, sleep, and hallucinations.  Will start quetiapine (Seroquel) 25 mg nightly.  Discussed he can take half tab for the first few nights to make sure he is not and have any adverse effects.  Discussed adverse effects and when to call/RTC.  Will schedule him follow-up with RAYMUNDO Mcfarlane in our office for further medication management.       -Patient is court ordered for BUSHRA treatment.  He is quite adverse to IOP as he finds groups triggering and has had 6 months of IOP and 6 months of residential care in the past.  Feels he already knows the coping skills and other BUSHRA treatment that he would have if attending IOP.  Discussed reports of groups being " triggering is typically an excuse and he agrees.  We did discuss what would actually be most beneficial therapeutically for him, in the presence of a court order, due to significant trauma hx, mood, psychosis, and family dynamics.  He is agreeable to individual therapy with BUSHRA focus.  We will get him scheduled biweekly and he will have regular and random urine drug screens as he goes through the process.  We have discussed if he is not actively participating in individual therapy or fails any UDS, we will have to refer to higher level of care and this would typically be IOP or residential.  -Narcan sample and OD education declined  -Safe medications storage education and locking medication bag declined  -Advisement to take part in and remain active in 12 Step Recovery Meetings, IOP, and/or 1:1 therapy/counseling and to establish/maintain an active relationship with a recovery sponsor. Has been given AA schedule for in-person meeting in [place]. Discussed [facility]  -Continued monitoring for illicit substances for patient safety, medication compliance and future care.  -Referred to Hep C clinic  -Has been given the contact info for  PCP and will call to schedule new patient appt    Visit Diagnoses     Methamphetamine use disorder, severe, in early remission (HCC)    -  Primary    Relevant Orders    Urine Drug Screen - Urine, Clean Catch    CBC & Differential (Completed)    Comprehensive Metabolic Panel (Completed)    HCV RNA By PCR, Qn Rfx Shayna    Hepatitis Panel, Acute (Completed)    HIV-1 / O / 2 Ag / Antibody 4th Generation (Completed)    TSH (Completed)    RPR (Completed)    Opioid use disorder, severe, in sustained remission (HCC)        Relevant Orders    Urine Drug Screen - Urine, Clean Catch    CBC & Differential (Completed)    Comprehensive Metabolic Panel (Completed)    HCV RNA By PCR, Qn Rfx Shayna    Hepatitis Panel, Acute (Completed)    HIV-1 / O / 2 Ag / Antibody 4th Generation (Completed)     TSH (Completed)    RPR (Completed)    Chronic hepatitis C without hepatic coma (HCC)        Relevant Orders    Urine Drug Screen - Urine, Clean Catch    CBC & Differential (Completed)    Comprehensive Metabolic Panel (Completed)    HCV RNA By PCR, Qn Rfx Shayna    Hepatitis Panel, Acute (Completed)    HIV-1 / O / 2 Ag / Antibody 4th Generation (Completed)    TSH (Completed)    RPR (Completed)    HUGO (generalized anxiety disorder)        Relevant Medications    QUEtiapine (SEROquel) 25 MG tablet    Auditory hallucinations        Relevant Medications    QUEtiapine (SEROquel) 25 MG tablet      Diagnoses       Codes Comments    Methamphetamine use disorder, severe, in early remission (HCC)    -  Primary ICD-10-CM: F15.21  ICD-9-CM: 304.43     Opioid use disorder, severe, in sustained remission (HCC)     ICD-10-CM: F11.21  ICD-9-CM: 304.03     Chronic hepatitis C without hepatic coma (HCC)     ICD-10-CM: B18.2  ICD-9-CM: 070.54     HUGO (generalized anxiety disorder)     ICD-10-CM: F41.1  ICD-9-CM: 300.02     Auditory hallucinations     ICD-10-CM: R44.0  ICD-9-CM: 780.1           Visit Diagnoses:    ICD-10-CM ICD-9-CM   1. Methamphetamine use disorder, severe, in early remission (HCC)  F15.21 304.43   2. Opioid use disorder, severe, in sustained remission (HCC)  F11.21 304.03   3. Chronic hepatitis C without hepatic coma (HCC)  B18.2 070.54   4. HUGO (generalized anxiety disorder)  F41.1 300.02   5. Auditory hallucinations  R44.0 780.1       PLAN:  2. Safety: No acute safety concerns  3. Risk Assessment: Risk of self-harm acutely is low. Risk of self-harm chronically is also low, but could be further elevated in the event of treatment noncompliance and/or AODA.    TREATMENT PLAN: Continue supportive psychotherapy efforts and medications as indicated. Treatment and medication options discussed during today's visit. Patient acknowledged and verbally consented to continue with current treatment plan and was educated on the  importance of compliance with treatment and follow-up appointments.    GOALS:  Short Term Goals: Patient will be compliant with medication, and patient will have no significant medication related side effects.  Patient will be engaged in psychotherapy as indicated.  Patient will report subjective improvement of symptoms.  Long term goals: To stabilize mood and treat/improve subjective symptoms, the patient will stay out of the hospital, the patient will be at an optimal level of functioning, and the patient will take all medications as prescribed.  The patient/guardian verbalized understanding and agreement with goals that were mutually set.    MEDICATION ISSUES:  MICHELE reviewed as expected.  Discussed medication options and treatment plan of prescribed medication as well as the risks, benefits, and side effects including potential falls, possible impaired driving and metabolic adversities among others. Patient is agreeable to call the office with any worsening of symptoms or onset of side effects. Patient is agreeable to call 911 or go to the nearest ER should he/she begin having SI/HI. No medication side effects or related complaints today.       TOBACCO USE:  Current every day smoker 3-10 mintues spent counseling Not agreeable to stopping    I advised Mo Miller of the risks of tobacco use.     MEDS ORDERED DURING VISIT:  New Medications Ordered This Visit   Medications   • QUEtiapine (SEROquel) 25 MG tablet     Sig: Take 1 tablet by mouth Every Night.     Dispense:  30 tablet     Refill:  0       Return follow up with me PRN, will schedule 4 weeks follow up with psych for ongoing med management.           This document has been electronically signed by ELI Dyer  February 21, 2023 10:39 EST      Part of this note may be an electronic transcription/translation of spoken language to printed text using the Dragon Dictation System.

## 2023-02-21 LAB
ALBUMIN SERPL-MCNC: 4.6 G/DL (ref 3.5–5.2)
ALBUMIN/GLOB SERPL: 1.6 G/DL
ALP SERPL-CCNC: 150 U/L (ref 39–117)
ALT SERPL W P-5'-P-CCNC: 23 U/L (ref 1–41)
ANION GAP SERPL CALCULATED.3IONS-SCNC: 7 MMOL/L (ref 5–15)
AST SERPL-CCNC: 28 U/L (ref 1–40)
BASOPHILS # BLD AUTO: 0.11 10*3/MM3 (ref 0–0.2)
BASOPHILS NFR BLD AUTO: 1 % (ref 0–1.5)
BILIRUB SERPL-MCNC: 0.4 MG/DL (ref 0–1.2)
BUN SERPL-MCNC: 7 MG/DL (ref 6–20)
BUN/CREAT SERPL: 7.6 (ref 7–25)
CALCIUM SPEC-SCNC: 9.9 MG/DL (ref 8.6–10.5)
CHLORIDE SERPL-SCNC: 102 MMOL/L (ref 98–107)
CO2 SERPL-SCNC: 29 MMOL/L (ref 22–29)
CREAT SERPL-MCNC: 0.92 MG/DL (ref 0.76–1.27)
DEPRECATED RDW RBC AUTO: 38.8 FL (ref 37–54)
EGFRCR SERPLBLD CKD-EPI 2021: 107.2 ML/MIN/1.73
EOSINOPHIL # BLD AUTO: 0.14 10*3/MM3 (ref 0–0.4)
EOSINOPHIL NFR BLD AUTO: 1.3 % (ref 0.3–6.2)
ERYTHROCYTE [DISTWIDTH] IN BLOOD BY AUTOMATED COUNT: 12.9 % (ref 12.3–15.4)
GLOBULIN UR ELPH-MCNC: 2.9 GM/DL
GLUCOSE SERPL-MCNC: 84 MG/DL (ref 65–99)
HAV IGM SERPL QL IA: ABNORMAL
HBV CORE IGM SERPL QL IA: ABNORMAL
HBV SURFACE AG SERPL QL IA: ABNORMAL
HCT VFR BLD AUTO: 49.1 % (ref 37.5–51)
HCV AB SER DONR QL: REACTIVE
HGB BLD-MCNC: 17.1 G/DL (ref 13–17.7)
IMM GRANULOCYTES # BLD AUTO: 0.03 10*3/MM3 (ref 0–0.05)
IMM GRANULOCYTES NFR BLD AUTO: 0.3 % (ref 0–0.5)
LYMPHOCYTES # BLD AUTO: 2.9 10*3/MM3 (ref 0.7–3.1)
LYMPHOCYTES NFR BLD AUTO: 27.3 % (ref 19.6–45.3)
MCH RBC QN AUTO: 29.3 PG (ref 26.6–33)
MCHC RBC AUTO-ENTMCNC: 34.8 G/DL (ref 31.5–35.7)
MCV RBC AUTO: 84.1 FL (ref 79–97)
MONOCYTES # BLD AUTO: 0.55 10*3/MM3 (ref 0.1–0.9)
MONOCYTES NFR BLD AUTO: 5.2 % (ref 5–12)
NEUTROPHILS NFR BLD AUTO: 6.9 10*3/MM3 (ref 1.7–7)
NEUTROPHILS NFR BLD AUTO: 64.9 % (ref 42.7–76)
NRBC BLD AUTO-RTO: 0 /100 WBC (ref 0–0.2)
PLATELET # BLD AUTO: 363 10*3/MM3 (ref 140–450)
PMV BLD AUTO: 9.3 FL (ref 6–12)
POTASSIUM SERPL-SCNC: 4.5 MMOL/L (ref 3.5–5.2)
PROT SERPL-MCNC: 7.5 G/DL (ref 6–8.5)
RBC # BLD AUTO: 5.84 10*6/MM3 (ref 4.14–5.8)
RPR SER QL: NORMAL
SODIUM SERPL-SCNC: 138 MMOL/L (ref 136–145)
TSH SERPL DL<=0.05 MIU/L-ACNC: 1.55 UIU/ML (ref 0.27–4.2)
WBC NRBC COR # BLD: 10.63 10*3/MM3 (ref 3.4–10.8)

## 2023-02-23 ENCOUNTER — TELEPHONE (OUTPATIENT)
Dept: PSYCHIATRY | Facility: CLINIC | Age: 43
End: 2023-02-23
Payer: COMMERCIAL

## 2023-02-23 LAB
HCV GENTYP SERPL NAA+PROBE: NORMAL
HCV RNA SERPL NAA+PROBE-ACNC: NORMAL IU/ML
HCV RNA SERPL NAA+PROBE-LOG IU: NORMAL LOG10 IU/ML
LABORATORY COMMENT REPORT: NORMAL

## 2023-02-23 NOTE — TELEPHONE ENCOUNTER
Please call and let him know his Hep C is showing not detected. This may mean his body has fought off the virus. He needs to have this lab drawn again in about 6 months to confirm. He can have this done here or with primary care. He can re infect, like we talked about in the office the other day.

## 2023-03-22 ENCOUNTER — OFFICE VISIT (OUTPATIENT)
Dept: PSYCHIATRY | Facility: CLINIC | Age: 43
End: 2023-03-22
Payer: COMMERCIAL

## 2023-03-22 VITALS
SYSTOLIC BLOOD PRESSURE: 132 MMHG | BODY MASS INDEX: 21.94 KG/M2 | WEIGHT: 162 LBS | HEART RATE: 69 BPM | HEIGHT: 72 IN | DIASTOLIC BLOOD PRESSURE: 78 MMHG

## 2023-03-22 DIAGNOSIS — F41.1 GAD (GENERALIZED ANXIETY DISORDER): Primary | Chronic | ICD-10-CM

## 2023-03-22 DIAGNOSIS — G47.09 OTHER INSOMNIA: Chronic | ICD-10-CM

## 2023-03-22 DIAGNOSIS — R44.0 AUDITORY HALLUCINATIONS: ICD-10-CM

## 2023-03-22 DIAGNOSIS — F15.21 METHAMPHETAMINE USE DISORDER, SEVERE, IN EARLY REMISSION: ICD-10-CM

## 2023-03-22 PROCEDURE — 1160F RVW MEDS BY RX/DR IN RCRD: CPT | Performed by: NURSE PRACTITIONER

## 2023-03-22 PROCEDURE — 99214 OFFICE O/P EST MOD 30 MIN: CPT | Performed by: NURSE PRACTITIONER

## 2023-03-22 PROCEDURE — 1159F MED LIST DOCD IN RCRD: CPT | Performed by: NURSE PRACTITIONER

## 2023-03-22 RX ORDER — QUETIAPINE FUMARATE 25 MG/1
25 TABLET, FILM COATED ORAL NIGHTLY
Qty: 30 TABLET | Refills: 2 | Status: SHIPPED | OUTPATIENT
Start: 2023-03-22

## 2023-03-22 NOTE — PROGRESS NOTES
Chief Complaint  Anxiety, Depression, and Hallucinations    Subjective          Mo Miller presents to [facility] for medication management of his anxiety, depression, hallucinations.    History of Present Illness: Patient presents today for a follow-up appointment after being seen for an initial evaluation on 02/01/2023.  He has also been seen by ELI Dyer for MAT.  However the patient decided he did not want to participate in MAT.  He does say he is doing online substance use disorder treatment to fulfill his court ordered treatment mandate.  Patient was prescribed Seroquel 25 mg nightly during his appointment with Savanna Beaver, however did not start it because he was not able to get the medication through his insurance.  Did confirm today his insurance only requires a prior authorization.  Patient admitted during his MAT evaluation that he hears voices on a daily basis.  When discussed with him today, the patient denies these are hallucinations, but also admits he hears them when no one else is around.  He says he has never asked anyone else if they hear them as well.  He says this does cause him to become very aggravated and causes anxiety as well.  He says he is still clean from all illicit substances, and has been for greater than 160 days.  He is still struggling with sleep, and says whether or not he is able to sleep is somewhat dependent on how much he has been doing during the day.  He is still working in [employer] services at [employer].  Patient denies any SI/HI, A/V hallucinations.      The following portions of the patient's history were reviewed and updated as appropriate: allergies, current medications, past family history, past medical history, past social history, past surgical history and problem list.      Current Medications:   Current Outpatient Medications   Medication Sig Dispense Refill   • QUEtiapine  "(SEROquel) 25 MG tablet Take 1 tablet by mouth Every Night. 30 tablet 2     No current facility-administered medications for this visit.       Mental Status Exam:   Hygiene:   fair  Cooperation:  Guarded  Eye Contact:  Good  Psychomotor Behavior:  Restless  Affect:  Full range  Mood: anxious  Speech:  Normal  Thought Process:  Goal directed  Thought Content:  Mood congruent  Suicidal:  None  Homicidal:  None  Hallucinations:  None  Delusion:  None  Memory:  Intact  Orientation:  Person, Place, Time and Situation  Reliability:  fair  Insight:  Fair  Judgement:  Fair  Impulse Control:  Fair  Physical/Medical Issues:  No        Objective   Vital Signs:   /78   Pulse 69   Ht 182.9 cm (72\")   Wt 73.5 kg (162 lb)   BMI 21.97 kg/m²     Physical Exam  Neurological:      Mental Status: He is oriented to person, place, and time. Mental status is at baseline.      Coordination: Coordination is intact.      Gait: Gait is intact.   Psychiatric:         Behavior: Behavior is cooperative.        Result Review :     The following data was reviewed by: ELI Booth on 03/22/2023:    Data reviewed: Previous note, MAT  note, medication history          Assessment and Plan    Diagnoses and all orders for this visit:    1. HUGO (generalized anxiety disorder) (Primary)  -     QUEtiapine (SEROquel) 25 MG tablet; Take 1 tablet by mouth Every Night.  Dispense: 30 tablet; Refill: 2    2. Other insomnia  -     QUEtiapine (SEROquel) 25 MG tablet; Take 1 tablet by mouth Every Night.  Dispense: 30 tablet; Refill: 2    3. Methamphetamine use disorder, severe, in early remission (HCC)    4. Auditory hallucinations         PHQ-9 Score:   PHQ-9 Total Score: 7      Depression Screening:  Patient screened positive for depression based on a PHQ-9 score of 7 on 3/22/2023. Follow-up recommendations include: Prescribed antidepressant medication treatment and Suicide Risk Assessment performed.        Tobacco Cessation:  Mo Galloway" Paul  reports that he has been smoking cigarettes. He has been smoking an average of 1 pack per day. He has never used smokeless tobacco.. I have educated him on the risk of diseases from using tobacco products such as cancer, COPD and heart disease.     I advised him to quit and he is not willing to quit.    I spent 3  minutes counseling the patient.           Impression/Plan:  -This is a follow-up appointment.  Patient presents today after being referred back for a second appointment following his MAT initial evaluation.  Patient reports difficulties with hearing voices, even when he is alone, although he is hesitant to define this as an auditory hallucination.  He denies any visual hallucinations.  He is struggling more so with his anxiety, as well as difficulties with his sleep.  When he presents to today's appointment he is very restless, his face and ears are also very swollen, which he describes was an allergic reaction.  Advised the patient to try taking Benadryl to help with this, he says he will get some today.  He was not able to start the Seroquel that was prescribed to him by ELI Dyer due to an insurance issue.  Advised patient it simply needs a PA, and we will be able to do this today.  -Start Seroquel 25 mg nightly.  -Patient's MICHELE report reviewed and deemed appropriate.  Patient counseled on use of controlled substances.  -Reviewed available lab work.  -Schedule follow-up appointment for 4 weeks or as needed.      MEDS ORDERED DURING VISIT:  New Medications Ordered This Visit   Medications   • QUEtiapine (SEROquel) 25 MG tablet     Sig: Take 1 tablet by mouth Every Night.     Dispense:  30 tablet     Refill:  2         Follow Up   Return in about 4 weeks (around 4/19/2023), or if symptoms worsen or fail to improve, for Next scheduled follow up, In-Person Appt.  Patient was given instructions and counseling regarding his condition or for health maintenance advice. Please see specific  information pulled into the AVS if appropriate.       TREATMENT PLAN/GOALS: Continue supportive psychotherapy efforts and medications as indicated. Treatment and medication options discussed during today's visit. Patient acknowledged and verbally consented to continue with current treatment plan and was educated on the importance of compliance with treatment and follow-up appointments.    MEDICATION ISSUES:  Discussed medication options and treatment plan of prescribed medication as well as the risks, benefits, and side effects including potential falls, possible impaired driving and metabolic adversities among others. Patient is agreeable to call the office with any worsening of symptoms or onset of side effects. Patient is agreeable to call 911 or go to the nearest ER should he/she begin having SI/HI.          This document has been electronically signed by ELI Merino, PMHNP-BC  March 22, 2023 15:32 EDT      Part of this note may be an electronic transcription/translation of spoken language to printed text using the Dragon Dictation System.

## 2023-04-05 ENCOUNTER — OFFICE VISIT (OUTPATIENT)
Dept: PSYCHIATRY | Facility: CLINIC | Age: 43
End: 2023-04-05
Payer: COMMERCIAL

## 2023-04-05 DIAGNOSIS — F41.1 GENERALIZED ANXIETY DISORDER: Primary | ICD-10-CM

## 2023-04-05 DIAGNOSIS — F15.20 METHAMPHETAMINE USE DISORDER, SEVERE: ICD-10-CM

## 2023-04-05 DIAGNOSIS — R44.0 AUDITORY HALLUCINATIONS: ICD-10-CM

## 2023-04-05 PROCEDURE — 90832 PSYTX W PT 30 MINUTES: CPT | Performed by: SOCIAL WORKER

## 2023-04-05 NOTE — PROGRESS NOTES
"Date: 04/05/2023   Time In: 1536  Time Out: 1605    PROGRESS NOTE  Data:  Mo Miller is a 42 y.o. male who presents today for individual therapy session at [facility]. Chief Complaint: anxiety    Patient presents to first therapy session with this provider. Patient reports he is here because of a court order. He reports he does not know what he is supposed to do here. He reports he got an AI is October 2022. He reports he has not used alcohol or drugs since his last arrest. He reports currently working to maintain sobriety. He reports he does not have cravings or urges to use. He reports no longer taking psychiatric medication because of leg twitching and says he does not need it anymore. He says he hears \"chattering\" sometimes. He reports he is unsure of therapy goals at this time. Patient denies SI/HI      Clinical Maneuvering/Intervention:    Assisted patient in processing above session content; acknowledged and normalized patient’s thoughts, feelings, and concerns.   Discussed triggers associated with patient's substance use.  Also discussed coping skills for patient to implement such as participating in AA/NA, obtaining sponsor, participating in activities and using supports.    Allowed patient to freely discuss issues without interruption or judgment. Provided safe, confidential environment to facilitate the development of positive therapeutic relationship and encourage open, honest communication. Assisted patient in identifying risk factors which would indicate the need for higher level of care including thoughts to harm self or others and/or self-harming behavior and encouraged patient to contact this office, call 911, or present to the nearest emergency room should any of these events occur. Discussed crisis intervention services and means to access. Patient adamantly and convincingly denies current suicidal or homicidal ideation or perceptual disturbance.    Assessment "   Patient appears to maintain relative stability as compared to their baseline.  However, patient continues to struggle with substance use and anxiety which continues to cause impairment in important areas of functioning.  As a result, they can be reasonably expected to continue to benefit from treatment and would likely be at increased risk for decompensation otherwise.    Mental Status Exam:   Hygiene:   good  Cooperation:  Guarded  Eye Contact:  Good  Psychomotor Behavior:  Appropriate  Affect:  Appropriate  Mood: anxious  Speech:  Normal  Thought Process:  Goal directed  Thought Content:  Normal  Suicidal:  None  Homicidal:  None  Hallucinations:  None  Delusion:  None  Memory:  Intact  Orientation:  Person, Place, Time and Situation  Reliability:  poor  Insight:  Poor  Judgement:  Fair  Impulse Control:  Fair  Physical/Medical Issues:  No      Patient's Support Network Includes:  children    Functional Status: Severe impairment    Progress toward goal: Not at goal    Prognosis: Guarded with Ongoing Treatment         Plan     VISIT DIAGNOSIS:     ICD-10-CM ICD-9-CM   1. Generalized anxiety disorder  F41.1 300.02   2. Methamphetamine use disorder, severe  F15.20 304.40   3. Auditory hallucinations  R44.0 780.1        Patient will continue in individual outpatient therapy with focus on improved functioning and coping skills, maintaining stability, and avoiding decompensation and the need for higher level of care.    Patient will adhere to medication regimen as prescribed and report any side effects. Patient will contact this office, call 911 or present to the nearest emergency room should suicidal or homicidal ideations occur. Provide Cognitive Behavioral Therapy and Solution Focused Therapy to improve functioning, maintain stability, and avoid decompensation and the need for higher level of care.     Return in about Return in about 2 weeks (around 4/19/2023). or earlier if symptoms worsen or fail to improve.             This document has been electronically signed by Celia Cisneros LCSW  April 5, 2023 15:39 EDT      Part of this note may be an electronic transcription/translation of spoken language to printed text using the Dragon Dictation System.

## 2023-04-24 ENCOUNTER — LAB (OUTPATIENT)
Dept: LAB | Facility: HOSPITAL | Age: 43
End: 2023-04-24
Payer: COMMERCIAL

## 2023-04-24 ENCOUNTER — OFFICE VISIT (OUTPATIENT)
Dept: PSYCHIATRY | Facility: CLINIC | Age: 43
End: 2023-04-24
Payer: COMMERCIAL

## 2023-04-24 VITALS — WEIGHT: 195.8 LBS | BODY MASS INDEX: 30.73 KG/M2 | HEIGHT: 67 IN

## 2023-04-24 DIAGNOSIS — Z79.899 MEDICATION MANAGEMENT: ICD-10-CM

## 2023-04-24 DIAGNOSIS — F41.1 GENERALIZED ANXIETY DISORDER: Primary | Chronic | ICD-10-CM

## 2023-04-24 DIAGNOSIS — R44.0 AUDITORY HALLUCINATIONS: ICD-10-CM

## 2023-04-24 DIAGNOSIS — F15.20 METHAMPHETAMINE USE DISORDER, SEVERE: ICD-10-CM

## 2023-04-24 LAB
AMPHET+METHAMPHET UR QL: NEGATIVE
AMPHETAMINES UR QL: NEGATIVE
BARBITURATES UR QL SCN: NEGATIVE
BENZODIAZ UR QL SCN: NEGATIVE
BUPRENORPHINE SERPL-MCNC: NEGATIVE NG/ML
CANNABINOIDS SERPL QL: NEGATIVE
COCAINE UR QL: NEGATIVE
METHADONE UR QL SCN: NEGATIVE
OPIATES UR QL: NEGATIVE
OXYCODONE UR QL SCN: NEGATIVE
PCP UR QL SCN: NEGATIVE
PROPOXYPH UR QL: NEGATIVE
TRICYCLICS UR QL SCN: NEGATIVE

## 2023-04-24 PROCEDURE — 80306 DRUG TEST PRSMV INSTRMNT: CPT

## 2023-04-24 PROCEDURE — 1159F MED LIST DOCD IN RCRD: CPT | Performed by: NURSE PRACTITIONER

## 2023-04-24 PROCEDURE — 1160F RVW MEDS BY RX/DR IN RCRD: CPT | Performed by: NURSE PRACTITIONER

## 2023-04-24 PROCEDURE — 99214 OFFICE O/P EST MOD 30 MIN: CPT | Performed by: NURSE PRACTITIONER

## 2023-04-24 NOTE — PROGRESS NOTES
"Chief Complaint  Anxiety, Depression, and Hallucinations    Subjective          Mo Miller presents to [facility] for medication management of his anxiety, depression, hallucinations.    History of Present Illness: Patient presents today for a follow-up appointment after last being seen on 03/22/2023.  Patient says he is no longer taking Seroquel that was prescribed by ELI Dyer.  He says it made his legs twitch at night, which was further keeping him awake instead of helping him sleep.  Patient says today \"things are also going okay\".  He says he has not had any significant issues with anxiety symptoms or mood dysfunction.  Patient also denies any hallucinations at this time as well.  He had his initial therapy evaluation with Celia Cisneros LCSW, but says he is not sure where they are going to go with it.  He does not currently have an next appointment scheduled, despite that being part of his agreement for his court ordered substance use treatment.  Patient says he is sleeping well and has no concerns there.  He also reports he is eating well.  He spends most of his time working, and is still working at [employer].  Patient denies any SI/HI, A/V hallucinations.      The following portions of the patient's history were reviewed and updated as appropriate: allergies, current medications, past family history, past medical history, past social history, past surgical history and problem list.      Current Medications:   No current outpatient medications on file.     No current facility-administered medications for this visit.       Mental Status Exam:   Hygiene:   fair  Cooperation:  Evasive  Eye Contact:  Fair  Psychomotor Behavior:  Restless  Affect:  Appropriate  Mood: anxious  Speech:  Normal  Thought Process:  Goal directed  Thought Content:  Mood congruent  Suicidal:  None  Homicidal:  None  Hallucinations:  Not demonstrated today  Delusion:  " "None  Memory:  Intact  Orientation:  Person, Place, Time and Situation  Reliability:  poor  Insight:  Poor  Judgement:  Fair  Impulse Control:  Fair  Physical/Medical Issues:  No        Objective   Vital Signs:   Ht 170.2 cm (67\")   Wt 88.8 kg (195 lb 12.8 oz)   BMI 30.67 kg/m²     Physical Exam  Neurological:      Mental Status: He is oriented to person, place, and time. Mental status is at baseline.      Coordination: Coordination is intact.      Gait: Gait is intact.   Psychiatric:         Behavior: Behavior is cooperative.        Result Review :     The following data was reviewed by: ELI Booth on 04/24/2023:    Data reviewed: Previous note, therapy note, medication history          Assessment and Plan    Diagnoses and all orders for this visit:    1. Generalized anxiety disorder (Primary)    2. Methamphetamine use disorder, severe    3. Auditory hallucinations    4. Medication management  -     Urine Drug Screen - Urine, Clean Catch; Future         PHQ-9 Score:   PHQ-9 Total Score: 4      Depression Screening:  Patient screened positive for depression based on a PHQ-9 score of 4 on 4/24/2023. Follow-up recommendations include: Suicide Risk Assessment performed.        Tobacco Cessation:  Mo Miller  reports that he has been smoking cigarettes. He has been smoking an average of 1 pack per day. He has never used smokeless tobacco.. I have educated him on the risk of diseases from using tobacco products such as cancer, COPD and heart disease.     I advised him to quit and he is not willing to quit.    I spent 3  minutes counseling the patient.           Impression/Plan:  -This is a follow-up appointment.  Patient presents today and reports he feels he has been doing well.  He is no longer taking medication saying he was having adverse effects and did not want to continue it.  Patient says today he does not feel he needs to take any further medication.  Advised patient this was fine.  " However he is not abiding by his agreement with ELI Dyer regarding his court ordered mandatory substance treatment.  Advised patient he would need to complete a urine drug screen today at the hospital, and that someone from the IOP program would be in touch with him.  Patient expresses understanding.  -Discontinue Seroquel 25 mg nightly.  -UDS ordered for completion at [facility] outpatient lab.  -Patient's MICHELE report reviewed and deemed appropriate.  Patient counseled on use of controlled substances.  -Reviewed available lab work.  -Patient can return as needed.      MEDS ORDERED DURING VISIT:  No orders of the defined types were placed in this encounter.        Follow Up   Return if symptoms worsen or fail to improve.  Patient was given instructions and counseling regarding his condition or for health maintenance advice. Please see specific information pulled into the AVS if appropriate.       TREATMENT PLAN/GOALS: Continue supportive psychotherapy efforts and medications as indicated. Treatment and medication options discussed during today's visit. Patient acknowledged and verbally consented to continue with current treatment plan and was educated on the importance of compliance with treatment and follow-up appointments.    MEDICATION ISSUES:  Discussed medication options and treatment plan of prescribed medication as well as the risks, benefits, and side effects including potential falls, possible impaired driving and metabolic adversities among others. Patient is agreeable to call the office with any worsening of symptoms or onset of side effects. Patient is agreeable to call 911 or go to the nearest ER should he/she begin having SI/HI.          This document has been electronically signed by ELI Merino, PMHNP-BC  April 25, 2023 13:36 EDT      Part of this note may be an electronic transcription/translation of spoken language to printed text using the Dragon Dictation System.

## 2023-04-27 LAB
1OH-MIDAZOLAM UR CFM-MCNC: NEGATIVE NG/ML
6MAM UR CFM-MCNC: NEGATIVE NG/ML
7AMINOCLONAZEPAM UR CFM-MCNC: NEGATIVE NG/ML
7AMINOCLONAZEPAM UR CFM-MCNC: NEGATIVE NG/ML
A-OH ALPRAZ UR CFM-MCNC: NEGATIVE NG/ML
ALPRAZ UR CFM-MCNC: NEGATIVE NG/ML
AMOBARBITAL UR CFM-MCNC: NEGATIVE NG/ML
AMPHET UR CFM-MCNC: NEGATIVE NG/ML
BUPRENORPHINE UR-MCNC: NEGATIVE NG/ML
BUTABARBITAL UR CFM-MCNC: NEGATIVE NG/ML
BUTALBITAL UR CFM-MCNC: NEGATIVE NG/ML
BZE UR CFM-MCNC: NEGATIVE NG/ML
CARBOXYTHC UR CFM-MCNC: NEGATIVE NG/ML
CARISOPRODOL UR CFM-MCNC: NEGATIVE NG/ML
CODEINE UR CFM-MCNC: NEGATIVE NG/ML
CREATININE: 108.4 MG/DL
DIAZEPAM UR CFM-MCNC: NEGATIVE NG/ML
EDDP UR CFM-MCNC: NEGATIVE NG/ML
ETHYL GLUCURONIDE UR CFM-MCNC: NEGATIVE NG/ML
FENTANYL UR-MCNC: NEGATIVE NG/ML
GABAPENTIN UR CFM-MCNC: NEGATIVE NG/ML
HYDROCODONE UR CFM-MCNC: NEGATIVE NG/ML
HYDROMORPHONE UR CFM-MCNC: NEGATIVE NG/ML
LORAZEPAM UR CFM-MCNC: NEGATIVE NG/ML
MDMA UR CFM-MCNC: NEGATIVE NG/ML
ME-PHENIDATE UR CFM-MCNC: NEGATIVE NG/ML
METHADONE UR CFM-MCNC: NEGATIVE NG/ML
METHAMPHET UR CFM-MCNC: NEGATIVE NG/ML
MIDAZOLAM UR CFM-MCNC: NEGATIVE NG/ML
MORPHINE UR CFM-MCNC: NEGATIVE NG/ML
NORBUPRENORPHINE UR CFM-MCNC: NEGATIVE NG/ML
NORDIAZEPAM UR CFM-MCNC: NEGATIVE NG/ML
NORFENTANYL UR CFM-MCNC: NEGATIVE NG/ML
NORHYDROCODONE UR CFM-MCNC: NEGATIVE NG/ML
NOROXYCODONE UR CFM-MCNC: NEGATIVE NG/ML
OXAZEPAM CTO UR CFM-MCNC: NEGATIVE NG/ML
OXYCODONE SERPL-MCNC: NEGATIVE NG/ML
OXYMORPHONE SERPL-MCNC: NEGATIVE NG/ML
PCP UR CFM-MCNC: NEGATIVE NG/ML
PH: 7.5 (ref 4.5–9)
PHENOBARB UR CFM-MCNC: NEGATIVE NG/ML
PHENTERMINE: NEGATIVE NG/ML
PPAA UR CFM-MCNC: NEGATIVE NG/ML
PREGABALIN UR CFM-MCNC: NEGATIVE NG/ML
SECOBARBITAL UR CFM-MCNC: NEGATIVE NG/ML
SPECIFIC GRAVITY: 1.01 (ref 1–1.03)
TAPENTADOL UR CFM-MCNC: NEGATIVE NG/ML
TEMAZEPAM UR CFM-MCNC: NEGATIVE NG/ML
TRAMADOL: NEGATIVE NG/ML
ZOLPIDEM PHENYL-4-CARB UR CFM-MCNC: NEGATIVE NG/ML
ZOLPIDEM UR CFM-MCNC: NEGATIVE NG/ML

## 2023-07-19 ENCOUNTER — APPOINTMENT (OUTPATIENT)
Dept: GENERAL RADIOLOGY | Facility: CLINIC | Age: 43
DRG: 202 | End: 2023-07-19
Attending: EMERGENCY MEDICINE
Payer: COMMERCIAL

## 2023-07-19 ENCOUNTER — APPOINTMENT (OUTPATIENT)
Dept: CT IMAGING | Facility: CLINIC | Age: 43
DRG: 202 | End: 2023-07-19
Attending: INTERNAL MEDICINE
Payer: COMMERCIAL

## 2023-07-19 ENCOUNTER — HOSPITAL ENCOUNTER (INPATIENT)
Facility: CLINIC | Age: 43
LOS: 1 days | Discharge: HOME | DRG: 202 | End: 2023-07-21
Attending: EMERGENCY MEDICINE | Admitting: INTERNAL MEDICINE
Payer: COMMERCIAL

## 2023-07-19 DIAGNOSIS — J45.901 ASTHMA WITH ACUTE EXACERBATION, UNSPECIFIED ASTHMA SEVERITY, UNSPECIFIED WHETHER PERSISTENT: Primary | ICD-10-CM

## 2023-07-19 DIAGNOSIS — R09.02 HYPOXIA: ICD-10-CM

## 2023-07-19 DIAGNOSIS — J98.01 ACUTE BRONCHOSPASM: ICD-10-CM

## 2023-07-19 LAB
ANION GAP SERPL CALCULATED.3IONS-SCNC: 12 MMOL/L (ref 7–15)
ATRIAL RATE - MUSE: 89 BPM
BASOPHILS # BLD AUTO: 0.1 10E3/UL (ref 0–0.2)
BASOPHILS NFR BLD AUTO: 1 %
BUN SERPL-MCNC: 20.5 MG/DL (ref 6–20)
CALCIUM SERPL-MCNC: 10.1 MG/DL (ref 8.6–10)
CHLORIDE SERPL-SCNC: 101 MMOL/L (ref 98–107)
CREAT SERPL-MCNC: 0.96 MG/DL (ref 0.67–1.17)
D DIMER PPP FEU-MCNC: 0.42 UG/ML FEU (ref 0–0.5)
DEPRECATED HCO3 PLAS-SCNC: 25 MMOL/L (ref 22–29)
DIASTOLIC BLOOD PRESSURE - MUSE: NORMAL MMHG
EOSINOPHIL # BLD AUTO: 0.1 10E3/UL (ref 0–0.7)
EOSINOPHIL NFR BLD AUTO: 1 %
ERYTHROCYTE [DISTWIDTH] IN BLOOD BY AUTOMATED COUNT: 12.3 % (ref 10–15)
FLUAV RNA SPEC QL NAA+PROBE: NEGATIVE
FLUBV RNA RESP QL NAA+PROBE: NEGATIVE
GFR SERPL CREATININE-BSD FRML MDRD: >90 ML/MIN/1.73M2
GLUCOSE SERPL-MCNC: 107 MG/DL (ref 70–99)
HCT VFR BLD AUTO: 46.1 % (ref 40–53)
HGB BLD-MCNC: 16.3 G/DL (ref 13.3–17.7)
IMM GRANULOCYTES # BLD: 0.1 10E3/UL
IMM GRANULOCYTES NFR BLD: 0 %
INTERPRETATION ECG - MUSE: NORMAL
LYMPHOCYTES # BLD AUTO: 1.6 10E3/UL (ref 0.8–5.3)
LYMPHOCYTES NFR BLD AUTO: 13 %
MCH RBC QN AUTO: 31.5 PG (ref 26.5–33)
MCHC RBC AUTO-ENTMCNC: 35.4 G/DL (ref 31.5–36.5)
MCV RBC AUTO: 89 FL (ref 78–100)
MONOCYTES # BLD AUTO: 0.5 10E3/UL (ref 0–1.3)
MONOCYTES NFR BLD AUTO: 4 %
NEUTROPHILS # BLD AUTO: 10.5 10E3/UL (ref 1.6–8.3)
NEUTROPHILS NFR BLD AUTO: 81 %
NRBC # BLD AUTO: 0 10E3/UL
NRBC BLD AUTO-RTO: 0 /100
NT-PROBNP SERPL-MCNC: <36 PG/ML (ref 0–450)
P AXIS - MUSE: 69 DEGREES
PLATELET # BLD AUTO: 392 10E3/UL (ref 150–450)
POTASSIUM SERPL-SCNC: 4.8 MMOL/L (ref 3.4–5.3)
PR INTERVAL - MUSE: 140 MS
QRS DURATION - MUSE: 86 MS
QT - MUSE: 360 MS
QTC - MUSE: 438 MS
R AXIS - MUSE: 54 DEGREES
RBC # BLD AUTO: 5.18 10E6/UL (ref 4.4–5.9)
RSV RNA SPEC NAA+PROBE: NEGATIVE
SARS-COV-2 RNA RESP QL NAA+PROBE: NEGATIVE
SODIUM SERPL-SCNC: 138 MMOL/L (ref 136–145)
SYSTOLIC BLOOD PRESSURE - MUSE: NORMAL MMHG
T AXIS - MUSE: 36 DEGREES
TROPONIN T SERPL HS-MCNC: <6 NG/L
VENTRICULAR RATE- MUSE: 89 BPM
WBC # BLD AUTO: 12.8 10E3/UL (ref 4–11)

## 2023-07-19 PROCEDURE — 36415 COLL VENOUS BLD VENIPUNCTURE: CPT | Performed by: EMERGENCY MEDICINE

## 2023-07-19 PROCEDURE — 96374 THER/PROPH/DIAG INJ IV PUSH: CPT

## 2023-07-19 PROCEDURE — 80048 BASIC METABOLIC PNL TOTAL CA: CPT | Performed by: EMERGENCY MEDICINE

## 2023-07-19 PROCEDURE — 85379 FIBRIN DEGRADATION QUANT: CPT | Performed by: EMERGENCY MEDICINE

## 2023-07-19 PROCEDURE — 83880 ASSAY OF NATRIURETIC PEPTIDE: CPT | Performed by: EMERGENCY MEDICINE

## 2023-07-19 PROCEDURE — G0378 HOSPITAL OBSERVATION PER HR: HCPCS

## 2023-07-19 PROCEDURE — 99222 1ST HOSP IP/OBS MODERATE 55: CPT | Performed by: INTERNAL MEDICINE

## 2023-07-19 PROCEDURE — 87040 BLOOD CULTURE FOR BACTERIA: CPT | Performed by: EMERGENCY MEDICINE

## 2023-07-19 PROCEDURE — 84484 ASSAY OF TROPONIN QUANT: CPT | Performed by: EMERGENCY MEDICINE

## 2023-07-19 PROCEDURE — 85025 COMPLETE CBC W/AUTO DIFF WBC: CPT | Performed by: EMERGENCY MEDICINE

## 2023-07-19 PROCEDURE — 250N000009 HC RX 250: Performed by: EMERGENCY MEDICINE

## 2023-07-19 PROCEDURE — 71250 CT THORAX DX C-: CPT

## 2023-07-19 PROCEDURE — 99285 EMERGENCY DEPT VISIT HI MDM: CPT | Mod: 25

## 2023-07-19 PROCEDURE — 71046 X-RAY EXAM CHEST 2 VIEWS: CPT

## 2023-07-19 PROCEDURE — 87637 SARSCOV2&INF A&B&RSV AMP PRB: CPT | Performed by: EMERGENCY MEDICINE

## 2023-07-19 PROCEDURE — 250N000011 HC RX IP 250 OP 636: Mod: JZ | Performed by: EMERGENCY MEDICINE

## 2023-07-19 PROCEDURE — 94640 AIRWAY INHALATION TREATMENT: CPT

## 2023-07-19 PROCEDURE — 93005 ELECTROCARDIOGRAM TRACING: CPT

## 2023-07-19 PROCEDURE — 84145 PROCALCITONIN (PCT): CPT | Performed by: INTERNAL MEDICINE

## 2023-07-19 RX ORDER — METHYLPREDNISOLONE SODIUM SUCCINATE 125 MG/2ML
125 INJECTION, POWDER, LYOPHILIZED, FOR SOLUTION INTRAMUSCULAR; INTRAVENOUS ONCE
Status: COMPLETED | OUTPATIENT
Start: 2023-07-19 | End: 2023-07-19

## 2023-07-19 RX ORDER — IPRATROPIUM BROMIDE AND ALBUTEROL SULFATE 2.5; .5 MG/3ML; MG/3ML
3 SOLUTION RESPIRATORY (INHALATION) ONCE
Status: COMPLETED | OUTPATIENT
Start: 2023-07-19 | End: 2023-07-20

## 2023-07-19 RX ORDER — DOXYCYCLINE 100 MG/1
100 CAPSULE ORAL 2 TIMES DAILY
Status: ON HOLD | COMMUNITY
End: 2023-07-21

## 2023-07-19 RX ORDER — ALBUTEROL SULFATE 90 UG/1
1-2 AEROSOL, METERED RESPIRATORY (INHALATION) EVERY 4 HOURS PRN
Status: ON HOLD | COMMUNITY
End: 2023-07-21

## 2023-07-19 RX ORDER — IBUPROFEN 200 MG
200 TABLET ORAL 2 TIMES DAILY PRN
COMMUNITY

## 2023-07-19 RX ORDER — METHYLPREDNISOLONE SODIUM SUCCINATE 125 MG/2ML
60 INJECTION, POWDER, LYOPHILIZED, FOR SOLUTION INTRAMUSCULAR; INTRAVENOUS EVERY 8 HOURS
Status: DISCONTINUED | OUTPATIENT
Start: 2023-07-20 | End: 2023-07-21

## 2023-07-19 RX ORDER — IPRATROPIUM BROMIDE AND ALBUTEROL SULFATE 2.5; .5 MG/3ML; MG/3ML
3 SOLUTION RESPIRATORY (INHALATION) ONCE
Status: COMPLETED | OUTPATIENT
Start: 2023-07-19 | End: 2023-07-19

## 2023-07-19 RX ORDER — FLUTICASONE PROPIONATE 50 MCG
1 SPRAY, SUSPENSION (ML) NASAL DAILY PRN
COMMUNITY

## 2023-07-19 RX ORDER — METHYLPHENIDATE HYDROCHLORIDE 27 MG/1
27 TABLET ORAL EVERY MORNING
COMMUNITY

## 2023-07-19 RX ADMIN — METHYLPREDNISOLONE SODIUM SUCCINATE 125 MG: 125 INJECTION, POWDER, FOR SOLUTION INTRAMUSCULAR; INTRAVENOUS at 19:57

## 2023-07-19 RX ADMIN — IPRATROPIUM BROMIDE AND ALBUTEROL SULFATE 3 ML: .5; 3 SOLUTION RESPIRATORY (INHALATION) at 19:57

## 2023-07-19 ASSESSMENT — ACTIVITIES OF DAILY LIVING (ADL)
ADLS_ACUITY_SCORE: 35
ADLS_ACUITY_SCORE: 35

## 2023-07-19 NOTE — ED TRIAGE NOTES
Pt reports being sick since fourth of July with runned down seen at  week ago prescribed abx albuterol and prednisone and has had no relief since then.  Has gotten worse in the past 24 hours

## 2023-07-19 NOTE — ED NOTES
Rapid Assessment Note    History:   Abdirahman Dyer is a 43 year old male who presents with coughing. The patient reports that his coughing began on the 4th of July about 2 weeks ago. He notes that he has been wheezing and occasionally coughs blood. Abdirahman states that his symptoms have worsened greatly in the last 24 hours. The patient denies any history of asthma or COPD and explains that he has very healthy lungs from doing triathlons and marathons. He denies sore throat, chest pain, COVID symptoms, or black/bloody stools. He is up to date on COVID vaccines and boosters. The patient denies recent long distance travel. The patient explains that he was treated by  8 days ago and prescribed albuterol, prednisone, and doxycycline which has not helped at all. He is not immunosuppressed and denies significant past medical history.      Exam:   BP (!) 151/108   Pulse 91   Temp (!) 96.7  F (35.9  C) (Temporal)   Resp (!) 34   SpO2 96%   Cardiovascular: Regular rate and rhythm, no murmur.  No rub.  Lungs:  No respiratory distress, bilateral wheezes.  Neuro: Alert, conversant.  Skin:  Warm, dry  Psych:  Normal mood    Plan of Care:   I evaluated the patient and developed an initial plan of care. I discussed this plan and explained that I, or one of my partners, would be returning to complete the evaluation.         I, Aram Chong, am serving as a scribe to document services personally performed by Lito Vilchis MD, based on my observations and the provider's statements to me.    7/19/2023  [employer]    Portions of this medical record were completed by a scribe. UPON MY REVIEW AND AUTHENTICATION BY ELECTRONIC SIGNATURE, this confirms (a) I performed the applicable clinical services, and (b) the record is accurate.        Lito Vilchis MD  07/19/23 [id number]

## 2023-07-20 LAB
LACTATE SERPL-SCNC: 1.5 MMOL/L (ref 0.7–2)
PROCALCITONIN SERPL IA-MCNC: 0.04 NG/ML

## 2023-07-20 PROCEDURE — 36415 COLL VENOUS BLD VENIPUNCTURE: CPT | Performed by: INTERNAL MEDICINE

## 2023-07-20 PROCEDURE — 250N000011 HC RX IP 250 OP 636: Mod: JZ | Performed by: INTERNAL MEDICINE

## 2023-07-20 PROCEDURE — 250N000009 HC RX 250: Performed by: INTERNAL MEDICINE

## 2023-07-20 PROCEDURE — 96375 TX/PRO/DX INJ NEW DRUG ADDON: CPT

## 2023-07-20 PROCEDURE — G0378 HOSPITAL OBSERVATION PER HR: HCPCS

## 2023-07-20 PROCEDURE — 250N000013 HC RX MED GY IP 250 OP 250 PS 637: Performed by: INTERNAL MEDICINE

## 2023-07-20 PROCEDURE — 94640 AIRWAY INHALATION TREATMENT: CPT

## 2023-07-20 PROCEDURE — 94640 AIRWAY INHALATION TREATMENT: CPT | Mod: 76

## 2023-07-20 PROCEDURE — 96376 TX/PRO/DX INJ SAME DRUG ADON: CPT

## 2023-07-20 PROCEDURE — 83605 ASSAY OF LACTIC ACID: CPT | Performed by: INTERNAL MEDICINE

## 2023-07-20 PROCEDURE — 99232 SBSQ HOSP IP/OBS MODERATE 35: CPT | Performed by: INTERNAL MEDICINE

## 2023-07-20 PROCEDURE — 250N000013 HC RX MED GY IP 250 OP 250 PS 637: Performed by: STUDENT IN AN ORGANIZED HEALTH CARE EDUCATION/TRAINING PROGRAM

## 2023-07-20 PROCEDURE — 99222 1ST HOSP IP/OBS MODERATE 55: CPT | Performed by: STUDENT IN AN ORGANIZED HEALTH CARE EDUCATION/TRAINING PROGRAM

## 2023-07-20 PROCEDURE — 999N000157 HC STATISTIC RCP TIME EA 10 MIN

## 2023-07-20 PROCEDURE — 120N000001 HC R&B MED SURG/OB

## 2023-07-20 RX ORDER — ALBUTEROL SULFATE 5 MG/ML
2.5 SOLUTION RESPIRATORY (INHALATION)
Status: DISCONTINUED | OUTPATIENT
Start: 2023-07-20 | End: 2023-07-21 | Stop reason: HOSPADM

## 2023-07-20 RX ORDER — AZITHROMYCIN 250 MG/1
250 TABLET, FILM COATED ORAL DAILY
Status: DISCONTINUED | OUTPATIENT
Start: 2023-07-21 | End: 2023-07-20

## 2023-07-20 RX ORDER — IBUPROFEN 400 MG/1
400 TABLET, FILM COATED ORAL ONCE
Status: COMPLETED | OUTPATIENT
Start: 2023-07-20 | End: 2023-07-20

## 2023-07-20 RX ORDER — ONDANSETRON 2 MG/ML
4 INJECTION INTRAMUSCULAR; INTRAVENOUS EVERY 6 HOURS PRN
Status: DISCONTINUED | OUTPATIENT
Start: 2023-07-20 | End: 2023-07-20

## 2023-07-20 RX ORDER — ACETAMINOPHEN 325 MG/1
650 TABLET ORAL EVERY 6 HOURS PRN
Status: DISCONTINUED | OUTPATIENT
Start: 2023-07-20 | End: 2023-07-21 | Stop reason: HOSPADM

## 2023-07-20 RX ORDER — CEFTRIAXONE 2 G/1
2 INJECTION, POWDER, FOR SOLUTION INTRAMUSCULAR; INTRAVENOUS EVERY 24 HOURS
Status: DISCONTINUED | OUTPATIENT
Start: 2023-07-21 | End: 2023-07-20

## 2023-07-20 RX ORDER — AZITHROMYCIN 250 MG/1
500 TABLET, FILM COATED ORAL ONCE
Status: COMPLETED | OUTPATIENT
Start: 2023-07-20 | End: 2023-07-20

## 2023-07-20 RX ORDER — CEFTRIAXONE 1 G/1
1 INJECTION, POWDER, FOR SOLUTION INTRAMUSCULAR; INTRAVENOUS EVERY 24 HOURS
Status: DISCONTINUED | OUTPATIENT
Start: 2023-07-20 | End: 2023-07-20

## 2023-07-20 RX ORDER — ONDANSETRON 2 MG/ML
4 INJECTION INTRAMUSCULAR; INTRAVENOUS EVERY 6 HOURS PRN
Status: DISCONTINUED | OUTPATIENT
Start: 2023-07-20 | End: 2023-07-21 | Stop reason: HOSPADM

## 2023-07-20 RX ORDER — ONDANSETRON 4 MG/1
4 TABLET, ORALLY DISINTEGRATING ORAL EVERY 6 HOURS PRN
Status: DISCONTINUED | OUTPATIENT
Start: 2023-07-20 | End: 2023-07-20

## 2023-07-20 RX ORDER — ONDANSETRON 4 MG/1
4 TABLET, ORALLY DISINTEGRATING ORAL EVERY 6 HOURS PRN
Status: DISCONTINUED | OUTPATIENT
Start: 2023-07-20 | End: 2023-07-21 | Stop reason: HOSPADM

## 2023-07-20 RX ORDER — ALBUTEROL SULFATE 0.83 MG/ML
2.5 SOLUTION RESPIRATORY (INHALATION)
Status: DISCONTINUED | OUTPATIENT
Start: 2023-07-20 | End: 2023-07-21 | Stop reason: HOSPADM

## 2023-07-20 RX ORDER — FLUTICASONE FUROATE AND VILANTEROL 200; 25 UG/1; UG/1
1 POWDER RESPIRATORY (INHALATION) DAILY
Status: DISCONTINUED | OUTPATIENT
Start: 2023-07-20 | End: 2023-07-21 | Stop reason: HOSPADM

## 2023-07-20 RX ADMIN — ALBUTEROL SULFATE 2.5 MG: 2.5 SOLUTION RESPIRATORY (INHALATION) at 13:44

## 2023-07-20 RX ADMIN — METHYLPREDNISOLONE SODIUM SUCCINATE 62.5 MG: 125 INJECTION, POWDER, FOR SOLUTION INTRAMUSCULAR; INTRAVENOUS at 23:37

## 2023-07-20 RX ADMIN — METHYLPREDNISOLONE SODIUM SUCCINATE 62.5 MG: 125 INJECTION, POWDER, FOR SOLUTION INTRAMUSCULAR; INTRAVENOUS at 05:40

## 2023-07-20 RX ADMIN — AZITHROMYCIN MONOHYDRATE 500 MG: 250 TABLET ORAL at 04:18

## 2023-07-20 RX ADMIN — METHYLPREDNISOLONE SODIUM SUCCINATE 62.5 MG: 125 INJECTION, POWDER, FOR SOLUTION INTRAMUSCULAR; INTRAVENOUS at 14:22

## 2023-07-20 RX ADMIN — IBUPROFEN 400 MG: 400 TABLET ORAL at 01:00

## 2023-07-20 RX ADMIN — FLUTICASONE FUROATE AND VILANTEROL TRIFENATATE 1 PUFF: 200; 25 POWDER RESPIRATORY (INHALATION) at 11:26

## 2023-07-20 RX ADMIN — CEFTRIAXONE SODIUM 1 G: 1 INJECTION, POWDER, FOR SOLUTION INTRAMUSCULAR; INTRAVENOUS at 04:19

## 2023-07-20 RX ADMIN — ALBUTEROL SULFATE 2.5 MG: 2.5 SOLUTION RESPIRATORY (INHALATION) at 19:42

## 2023-07-20 RX ADMIN — ALBUTEROL SULFATE 2.5 MG: 2.5 SOLUTION RESPIRATORY (INHALATION) at 08:20

## 2023-07-20 ASSESSMENT — ACTIVITIES OF DAILY LIVING (ADL)
WALKING_OR_CLIMBING_STAIRS_DIFFICULTY: NO
CONCENTRATING,_REMEMBERING_OR_MAKING_DECISIONS_DIFFICULTY: NO
ADLS_ACUITY_SCORE: 31
TOILETING_ISSUES: NO
ADLS_ACUITY_SCORE: 35
DIFFICULTY_EATING/SWALLOWING: NO
ADLS_ACUITY_SCORE: 31
ADLS_ACUITY_SCORE: 35
DRESSING/BATHING_DIFFICULTY: NO
WEAR_GLASSES_OR_BLIND: NO
ADLS_ACUITY_SCORE: 18
CHANGE_IN_FUNCTIONAL_STATUS_SINCE_ONSET_OF_CURRENT_ILLNESS/INJURY: YES
ADLS_ACUITY_SCORE: 35
FALL_HISTORY_WITHIN_LAST_SIX_MONTHS: NO
ADLS_ACUITY_SCORE: 18
ADLS_ACUITY_SCORE: 18
DOING_ERRANDS_INDEPENDENTLY_DIFFICULTY: NO
ADLS_ACUITY_SCORE: 31
ADLS_ACUITY_SCORE: 31

## 2023-07-20 NOTE — UTILIZATION REVIEW
Admission Status; Secondary Review Determination       Under the authority of the Utilization Management Committee, the utilization review process indicated a secondary review on the above patient. The review outcome is based on review of the medical records, discussions with staff, and applying clinical experience noted on the date of the review.     (x) Inpatient Status Appropriate - This patient's medical care is consistent with medical management for inpatient care and reasonable inpatient medical practice.     RATIONALE FOR DETERMINATION      Patient requires inpatient admission versus short stay observation or outpatient treatment for the following reasons:       42 y/o man with history of asthma as a child, very active man ( runs and bikes) with no symptoms, non-smoker,  reports progressive dyspnea both w/ exertion and at rest since July 4. He was evaluated in UC and he was prescribed prednisone, doxycycline, and albuterol. His symptoms worsened and he present to ER with  dyspnea, cough, wheezing, and hypoxemia ( 88 % RA). He is currently being treated w/ azithromycin, CTX, albuterol nebs, and methylprednisolone.  He continues to have expiratory wheezes, frequent dry cough.  He requires 3 L of oxygen to keep the pulse ox 91 to 95%  The CT chest shows- Right lower lobe predominant tree-in-bud/micronodularity compatible with infectious/inflammatory etiologies (e.g., infectious bronchiolitis or aspiration) and pulmonary consult recommended to continue IV Solu-Medrol 60 mg every 8 hours, and albuterol nebulizers every 6 hours    43-year-old man with progressive dyspnea for 2 weeks, despite UC treatment with prednisone doxycycline and albuterol, presents to emergency room with dyspnea, wheezes, hypoxemia requiring 3 L of oxygen.  Chest CT shows right lower lobe infectious/inflammatory changes.  The patient continues to wheeze despite albuterol nebulizer every 6 hours and IV Solu-Medrol 60 mg every 8 hours, and  she still requires 3 L of oxygen.     Dr Briceno notified     The expected length of stay at the time of admission was more than 2 nights because of the severity of illness, intensity of service provided, and risk for adverse outcome. Inpatient admission is appropriate.         This document was produced using voice recognition software       The information on this document is developed by the utilization review team in order for the business office to ensure compliance. This only denotes the appropriateness of proper admission status and does not reflect the quality of care rendered.   The definitions of Inpatient Status and Observation Status used in making the determination above are those provided in the CMS Coverage Manual, Chapter 1 and Chapter 6, section 70.4.   Sincerely,   LILLIAM COREA MD   Utilization Review  Physician Advisor  [facility]

## 2023-07-20 NOTE — ED PROVIDER NOTES
History     Chief Complaint:  Cough     The history is provided by the patient.      Abdirahman Dyer is a 43 year old male with history of anxiety who presents to the ED for evaluation of a cough. The patient reports that on July 4th around 1800, he began to feel very tired and lethargic, so he took a nap which he normally never does. He does state that he was very active that day with his kids so he thought it was due to that. When he woke up the next day, he still felt crummy and didn't feel good, and this went on until Saturday the 8th. The morning of the 8th, he reports that he woke up with a cough. On the following Tuesday, the 11th, he traveled for work via airplane, but he states he wasn't short of breath. When he arrived home, his wife noticed wheezing and was convinced it was pneumonia, so he went to Urgent Care where he was diagnosed with bronchitis. Patient was prescribed Doxycyline and was taking one pill every day until today, but he did not receive an X-ray while at Urgent Care. Also used Albuterol with no relief. States that he talks on the phone for work and that he is unable to have a full conversation without taking multiple breaths. States he feels like he has only 20% of his lungs compared to normal. Reports that he did cough up a little blood. Denies vomiting, nausea, diarrhea, pain in legs, or swelling in legs. Denies tobacco or marijuana use. Denies COVID exposure. Denies history of blood clots, DVT, PE, heart failure, diabetes, high cholesterol, or lung problems.    Independent Historian:   None - Patient Only    Review of External Notes:   Dr. Oliveira urgent care note where the patient was seen for similar presentation and diagnosed with lower respiratory tract infection and bronchospasm, placed on doxycycline, prednisone and an albuterol inhaler.    Medications:    Malarone  Concerta  Doxycycline  Prednisone  Albuterol  Antivert  Zofran  Valium    Past Medical History:    ADHD  Anxiety    Solitary kidney  Hydrocele   Vertigo     Past Surgical History:    Incisional hernia repair  Solitary kidney acquired  Kidney donation  Appendectomy   Hernia repair  Vasectomy     Physical Exam     Patient Vitals for the past 24 hrs:   BP Temp Temp src Pulse Resp SpO2   07/19/23 2120 -- -- -- -- -- (!) 89 %   07/19/23 2110 -- -- -- -- -- 93 %   07/19/23 1837 (!) 151/108 (!) 96.7  F (35.9  C) Temporal 91 (!) 34 96 %      Physical Exam  General:  Sitting on bed.  HENT:  No obvious trauma to head  Right Ear:  External ear normal.   Left Ear:  External ear normal.   Nose:  Nose normal.   Eyes:  Conjunctivae and EOM are normal. Pupils are equal, round, and reactive.   Neck: Normal range of motion. Neck supple. No tracheal deviation present.   CV:  Normal heart sounds. No murmur heard.  Pulm/Chest: Scattered wheezes throughout. Answering questions in full but short sentences.  Abd: Soft. No distension. There is no tenderness. There is no rigidity, no rebound and no guarding.   M/S: Normal range of motion.  No calf pain or swelling bilateral.  Neuro: Awake and alert.   Skin: Skin is warm and dry. No rash noted. Not diaphoretic.   Psych: Normal mood and affect. Behavior is normal.      Emergency Department Course   ECG  ECG results from 07/19/23   EKG 12-lead, tracing only     Value    Systolic Blood Pressure     Diastolic Blood Pressure     Ventricular Rate 89    Atrial Rate 89    NC Interval 140    QRS Duration 86        QTc 438    P Axis 69    R AXIS 54    T Axis 36    Interpretation ECG      Sinus rhythm  Normal ECG  When compared with ECG of 22-JUL-2003 09:28,  Vent. rate has increased BY  29 BPM  Confirmed by GENERATED REPORT, COMPUTER ([id number]),  Aasen, Bradley ([id number]) on 7/19/2023 8:12:39 PM        Imaging:  XR Chest 2 Views   Final Result   IMPRESSION: Negative chest.         Report per radiology    Laboratory:  Labs Ordered and Resulted from Time of ED Arrival to Time of ED Departure   BASIC METABOLIC  PANEL - Abnormal       Result Value    Sodium 138      Potassium 4.8      Chloride 101      Carbon Dioxide (CO2) 25      Anion Gap 12      Urea Nitrogen 20.5 (*)     Creatinine 0.96      Calcium 10.1 (*)     Glucose 107 (*)     GFR Estimate >90     CBC WITH PLATELETS AND DIFFERENTIAL - Abnormal    WBC Count 12.8 (*)     RBC Count 5.18      Hemoglobin 16.3      Hematocrit 46.1      MCV 89      MCH 31.5      MCHC 35.4      RDW 12.3      Platelet Count 392      % Neutrophils 81      % Lymphocytes 13      % Monocytes 4      % Eosinophils 1      % Basophils 1      % Immature Granulocytes 0      NRBCs per 100 WBC 0      Absolute Neutrophils 10.5 (*)     Absolute Lymphocytes 1.6      Absolute Monocytes 0.5      Absolute Eosinophils 0.1      Absolute Basophils 0.1      Absolute Immature Granulocytes 0.1      Absolute NRBCs 0.0     TROPONIN T, HIGH SENSITIVITY - Normal    Troponin T, High Sensitivity <6     INFLUENZA A/B, RSV, & SARS-COV2 PCR - Normal    Influenza A PCR Negative      Influenza B PCR Negative      RSV PCR Negative      SARS CoV2 PCR Negative     D DIMER QUANTITATIVE - Normal    D-Dimer Quantitative 0.42     NT PROBNP INPATIENT - Normal    N terminal Pro BNP Inpatient <36     BLOOD CULTURE   BLOOD CULTURE      Emergency Department Course & Assessments:     Interventions:  Medications   sodium chloride (PF) 0.9% PF flush 3 mL (has no administration in time range)   sodium chloride (PF) 0.9% PF flush 3 mL (has no administration in time range)   ipratropium - albuterol 0.5 mg/2.5 mg/3 mL (DUONEB) neb solution 3 mL (has no administration in time range)   ipratropium - albuterol 0.5 mg/2.5 mg/3 mL (DUONEB) neb solution 3 mL (3 mLs Nebulization $Given 7/19/23 1957)   methylPREDNISolone sodium succinate (solu-MEDROL) injection 125 mg (125 mg Intravenous $Given 7/19/23 1957)      Independent Interpretation (X-rays, CTs, rhythm strip):  I independently reviewed the chest XR and see no evidence for pneumothorax or  infiltrate.     Assessments/Consultations/Discussion of Management or Tests:  ED Course as of 07/19/23 2134 Wed Jul 19, 2023 1936 I obtained history and examined the patient as noted above.    2015 I rechecked the patient and reexamined the lungs. Patient feels much better. Now more wheezing appreciated. Better air exchange with persistent prolonged expiatory phase.   2109 I rechecked and updated the patient.    2121 I rechecked and updated the patient.    2129 I rechecked and updated the patient.    2132 I spoke with hospitalist Dr. Juárez regarding the patient and plan of care.     Social Determinants of Health affecting care:   None    Disposition:  The patient was admitted to the hospital under the care of Dr. Juárez.    Impression & Plan      Medical Decision Making:  Abdirahman Dyer is a very pleasant 43 year old year old patient who presents to the emergency department with concern of cough, shortness of breath that has been persistent since July 4.  The patient has no prior pulmonary issues/disease.  He does report perhaps in childhood he had asthma, but has not used an inhaler for decades.  He was seen at an urgent care last week and placed on doxycycline, prednisone and an albuterol inhaler.  He has been using all those, but continues to be short of breath.  The patient did have slight wheezing throughout.  He received a DuoNeb treatment and had even more wheezing and a prolonged expiratory phase it was quite noticeable.  It seems like asthma, but he has no history of this.  He also received the Solu-Medrol.  After the neb treatment, he ambulated in the room and had a pulse oximetry of 88% on room air and was short of breath.  The patient's D-dimer is negative; therefore, I doubt pulmonary embolism.  He donated a kidney years ago so with a negative D-dimer I do not want to perform a CT PE study at this time. The chest xray was reviewed and shows no evidence of pneumothorax, infiltrate to suggest  pneumonia, widened mediastinum to suggest aortic dissection, obvious rib fracture or free air under the diaphragm to suggest perforated viscous ulcer.  Screen EKG does not show any acute ST changes and troponin is negative; therefore, doubt myocarditis.  BNP is negative so doubt CHF.  The patient does have a trace leukocytosis which is likely reactive from being on prednisone, but may be a sign of infectious etiology.  I did obtain 2 blood cultures in case there is a bacterial component to this that is not evident.  Given the hypoxia of 88% after neb and treatment and after attempted outpatient treatment as above, the patient will be admitted to the hospital.  He has no history of sleep apnea, but has reported episodes recently where he wakes up gasping for air.  He reports only being able to sleep in 90-minute increments because he will wake up short of breath.  He reported that he would not have felt comfortable going to sleep tonight without having received the neb treatments.  The patient, again, has no prior known pulmonary disease or issues.  He will be admitted to the hospital for observation for continued nebs, steroids. I spoke to the hospitalist, , who has agreed to admit the patient for continued evaluation and treatment.    Diagnosis:    ICD-10-CM    1. Acute bronchospasm  J98.01 Nebulizer and Supplies Order    failed out pt treatment      2. Hypoxia  R09.02          Discharge Medications:  New Prescriptions    No medications on file      Scribe Disclosure:  JAMIN ARREOLA, am serving as a scribe at 7:22 PM on 7/19/2023 to document services personally performed by Gary Self DO, based on my observations and the provider's statements to me.     7/19/2023   Gary Self DO Anderson, Robert James, DO  07/19/23 2145

## 2023-07-20 NOTE — ED NOTES
[facility]  ED Nurse Handoff Report    ED Chief complaint: Cough      ED Diagnosis:   Final diagnoses:   Acute bronchospasm - failed out pt treatment   Hypoxia       Code Status: Full Code    Allergies:   Allergies   Allergen Reactions     Banana Anaphylaxis       Patient Story: pt presents with cough and sob. Pt states he has been feeling sick and having a cough. Pt was seen at  and prescribed abx prednisone and albuterol without relief.  Pt presents with more cough and sob today.  Focused Assessment:  Pt to be admitted for observation all tests negative but pt desaturated down to 88% when ambulating around the room.     Treatments and/or interventions provided: blood work covid, solumedrol duo neb   Patient's response to treatments and/or interventions: breathing slightly improved     To be done/followed up on inpatient unit:  observation for desaturation     Does this patient have any cognitive concerns?: none     Activity level - Baseline/Home:  Independent  Activity Level - Current:   Independent    Patient's Preferred language: English   Needed?: No    Isolation: None  Infection: Not Applicable  Patient tested for COVID 19 prior to admission: YES  Bariatric?: No    Vital Signs:   Vitals:    07/19/23 1837 07/19/23 2110 07/19/23 2120   BP: (!) 151/108     Pulse: 91     Resp: (!) 34     Temp: (!) 96.7  F (35.9  C)     TempSrc: Temporal     SpO2: 96% 93% (!) 89%       Cardiac Rhythm:     Was the PSS-3 completed:   Yes  What interventions are required if any?               Family Comments:   OBS brochure/video discussed/provided to patient/family: No              Name of person given brochure if not patient:               Relationship to patient: none present     For the majority of the shift this patient's behavior was Green.   Behavioral interventions performed were none .    ED NURSE PHONE NUMBER: ED RN

## 2023-07-20 NOTE — PROGRESS NOTES
RECEIVING UNIT ED HANDOFF REVIEW    ED Nurse Handoff Report was reviewed by: Shelia Pruett RN on July 20, 2023 at 5:13 AM

## 2023-07-20 NOTE — PHARMACY-ADMISSION MEDICATION HISTORY
Pharmacist Admission Medication History    Admission medication history is complete. The information provided in this note is only as accurate as the sources available at the time of the update.    Medication reconciliation/reorder completed by provider prior to medication history? No    Information Source(s): Patient and CareEverywhere/SureScripts via in-person    Pertinent Information:     Changes made to PTA medication list:    Added: all    Deleted: None    Changed: None    Medication Affordability:       Allergies reviewed with patient and updates made in EHR: yes    Medication History Completed By: Akil Aliheyder, RPH 7/19/2023 10:09 PM    Prior to Admission medications    Medication Sig Last Dose Taking? Auth Provider Long Term End Date   albuterol (PROAIR HFA/PROVENTIL HFA/VENTOLIN HFA) 108 (90 Base) MCG/ACT inhaler Inhale 1-2 puffs into the lungs every 4 hours as needed for shortness of breath, wheezing or cough 7/19/2023 at am Yes Unknown, Entered By History Yes    doxycycline hyclate (VIBRAMYCIN) 100 MG capsule Take 100 mg by mouth 2 times daily Started 7/11/23 for 10 days. 7/19/2023 at am Yes Unknown, Entered By History     fluticasone (FLONASE) 50 MCG/ACT nasal spray Spray 1 spray into both nostrils daily as needed for rhinitis or allergies prn Yes Unknown, Entered By History     ibuprofen (ADVIL/MOTRIN) 200 MG tablet Take 200 mg by mouth 2 times daily as needed for pain prn Yes Unknown, Entered By History     methylphenidate HCL ER, OSM, (CONCERTA) 27 MG CR tablet Take 27 mg by mouth every morning 7/19/2023 Yes Unknown, Entered By History

## 2023-07-20 NOTE — PLAN OF CARE
Goal Outcome Evaluation:       Patient here with difficulty breathing. Alert, oriented times 4. On 3 L of O2, O2 sats 92-95%. Ambulated in hallway, with supplemental O2. Productive cough, sputum white/yellow. Lung sounds coarse and wheezy. On a regular diet, good appetite. Up independently in room. Pulmonology and hospitalist are following.

## 2023-07-20 NOTE — CONSULTS
Pulmonary Consult Note    Date of Service: 07/20/23    Assessment and Recommendations:  43M admitted 7/19 w/ dyspnea, cough, wheezing, and exertional hypoxemia. Clinical history consistent with asthma. Likely triggered by viral illness vs poor air-quality vs allergies.     - start ICS-LABA (Breo), rinse mouth out after use  - continue prn albuterol and nebs  - defer ABx to primary, consider stopping w/ negative procal  - continue methylprednisolone, for now, will consider transition to prednisone tomorrow pending clinical course  - I will have him set up to see me in clinic w/ PFTs and FeNO    Pulmonary will continue to follow.     Chief Complaint   Patient presents with     Cough       Summary:  43M admitted 7/19 w/ dyspnea, cough, wheezing, and exertional hypoxemia. Was recently seen in  and received prednisone, doxycycline, and albuterol. He is currently being treated w/ azithromycin, CTX, albuterol nebs, and methylprednisolone. He reports progressive dyspnea both w/ exertion and at rest since July 4. He does feel he had a viral illness, at this time. He has had dry cough that progressed to waking him from sleep prior to coming to the hospital. He does hear wheezing. Has chest tightness. Nebs have helped. No orthopnea, PND, or LE edema. He works from home, no obvious exposures. Never smoker. Typically very active, runs and bikes. History of childhood asthma, but has not had an inhaler since he was a child. He does have seasonal allergies.     10 point review of systems negative, aside from that mentioned above    /80 (BP Location: Right arm)   Pulse 89   Temp 97.7  F (36.5  C) (Oral)   Resp 18   SpO2 93%   Gen: NAD  HEENT: anicteric, OP clear, Mallampati III  Card: RRR  Pulm: clear bilaterally   Abd: soft, NTND  MSK: no LE edema, no acute joint abnormalities  Skin: no obvious rash  Psych: normal affect  Neuro: answering questions appropriately     Labs: personally reviewed  Abs eos (7/2023) - 100      Imaging: personally reviewed    PMH: asthma    PSH: reviewed and non-contributory    FHx: reviewed and non-contributory     Social History     Socioeconomic History     Marital status: Single     Spouse name: Not on file     Number of children: Not on file     Years of education: Not on file     Highest education level: Not on file   Occupational History     Not on file   Tobacco Use     Smoking status: Not on file     Smokeless tobacco: Not on file   Substance and Sexual Activity     Alcohol use: Not on file     Drug use: Not on file     Sexual activity: Not on file   Other Topics Concern     Not on file   Social History Narrative     Not on file     Social Determinants of Health     Financial Resource Strain: Not on file   Food Insecurity: Not on file   Transportation Needs: Not on file   Physical Activity: Not on file   Stress: Not on file   Social Connections: Not on file   Intimate Partner Violence: Not on file   Housing Stability: Not on file       Campos Trevino MD  Pulmonary and Critical Care Medicine  [facility]

## 2023-07-20 NOTE — H&P
[facility]    History and Physical - Hospitalist Service       Date of Admission:  7/19/2023    Assessment & Plan      Abdirahman Dyer is a 43 year old male admitted history of childhood asthma is admitted on 7/19/2023 with newly reported shortness of breath, cough/wheezing and exertional hypoxia refractory to therapy in the last month.     1. Acute exertional hypoxia with bronchospasm    Patient presented to the emergency room with increasing shortness of breath but by history started shortly after 4 July.    He had noted decrease in endurance and increasing shortness of breath as outlined in HPI.  At baseline he is very active doing multiple athletic things but has not been able to do any of these activities given his shortness of breath.    He was seen in the clinic in urgent care 7/11 and reported some bronchospasm at that point.  Was started on prednisone 40 mg for 5 days, albuterol and doxycycline    He felt like he never really got better.    Over the last few days his breathing has gotten excessively worse.    He can hear himself wheeze in association with continued coughing.  Feels chest tightness as well.  Prior to the last 2 weeks he had no prior respiratory symptoms since childhood.    Reports when he talk to his friends on the phone they could hear him wheezing and he was short of breath just at rest.    He has had 2 chest x-rays including in the ER which were negative.    D-dimer 0.42    EKG negative and troponin negative.     In addition while in the emergency room with exerting himself down the hallway sats went down to 88% and > 90% at rest.     CT chest non contrast ordered as he has refractory SOB with hypoxia and neg CXR (ddimer 0.42) cough, bronchospasm.     Continue steroids and nebs    Pulmonary consult 7/20 given refractory symptoms despite recurrent treatment with steroids, inhalers and 1 course of Doxy    Check procalcitonin    Influenza AB, RSV and SARS  "negative    2. Code status full        Diet:  regular   DVT Prophylaxis: pneumoboots and ambulate   Chavez Catheter: Not present  Lines: None     Cardiac Monitoring: None  Code Status:  full     Clinically Significant Risk Factors Present on Admission                                Disposition Plan  home in < 24 hours            JG DOUGLAS MD  Hospitalist Service  [facility]  Securely message with Snapt (more info)  Text page via [facility] Paging/Directory     ______________________________________________________________________    Chief Complaint   Shortness of breath and refractory coughing with wheezing     History is obtained from the patient and chart review     History of Present Illness   Abdirahman Dyer is a 43 year old male admitted history of childhood asthma is admitted on 7/19/2023 with newly reported shortness of breath, cough/wheezing and exertional hypoxia refractory to therapy in the last month.       Symptoms started on July 4th doing many activities. After dinner felt lethargic and barely able to stay awake. Next day lethargic and tired. He noticed to have breathing problems near about July 8th. He could not resume exercise as he had low energy. He had a dry cough and short of breath. No overt fevers. Felt sweaty and clammy with normal temperature. Went out of town July 10th had to travel and went to [place] took dayquil. When he came home had low energy and hacking cough and not productive. Coughing fits. Yellow stuff occasional.   Last Tuesday went to  and no pneumonia with bronchitis. Given prednisone for 5 days, doxycycline and albuterol.  He had a light wheeze then and \"crackles more than a wheeze\"  No noticeable improvement and the last 36 hours down hill and he is short of breath on the phone. He sat at a desk. Walked his dog after work. At 6 pm last night had difficulty breathing. Profuse sweating and short of breath. Hacking and coughing with seeing starts. "   Feel like coughing something big and gets dizzy.   Lying down makes it worse.   He has not gone out of the country since February.     Don't smoke. No vaping and no drugs.   Social alcohol and with dinner.         Past Medical History    Asthma as a child     Past Surgical History   No past surgical history on file.    Prior to Admission Medications   None        Review of Systems    The 10 point Review of Systems is negative other than noted in the HPI or here.      Physical Exam   Vital Signs: Temp: (!) 96.7  F (35.9  C) Temp src: Temporal BP: (!) 151/108 Pulse: 91   Resp: (!) 34 SpO2: (!) 89 % O2 Device: None (Room air)    Weight: 0 lbs 0 oz    General Appearance: Patient lying in bed in NAD   Eyes: PERRLA  HEENT: Oral pharynx is clear with no erythema or exudate  Respiratory: Patient has inspiratory and expiratory wheezes throughout both lung fields  Cardiovascular: Regular rate and rhythm without gallop rub normal S1-S2  GI: Abdomen soft rebound guarding or tenderness  Skin: no rashes   Musculoskeletal: Moving all extremities   Neurologic: Awake and alert      Medical Decision Making       60 MINUTES SPENT BY ME on the date of service doing chart review, history, exam, documentation & further activities per the note.      Data     I have personally reviewed the following data over the past 24 hrs:    12.8 (H)  \   16.3   / 392     138 101 20.5 (H) /  107 (H)   4.8 25 0.96 \       Trop: <6 BNP: <36       INR:  N/A PTT:  N/A   D-dimer:  0.42 Fibrinogen:  N/A       Imaging results reviewed over the past 24 hrs:   Recent Results (from the past 24 hour(s))   XR Chest 2 Views    Narrative    EXAM: XR CHEST 2 VIEWS  LOCATION: [facility]  DATE: 7/19/2023    INDICATION: 10 days of minimally productive cough, wheezing, new shortness of breath for the past 24 hours with chest tightness  COMPARISON: 08/27/2004.      Impression    IMPRESSION: Negative chest.

## 2023-07-20 NOTE — PLAN OF CARE
Pt here with SOB and cough since July 4th, recently getting worse. A&O x4, VSS on 1.5L O2. Pt dipped to 89% on RA. LS with expiratory wheezes, frequent dry cough present. Up Independently. On regular diet. Continues on IV solumedrol and abx. Discharge pending.

## 2023-07-20 NOTE — PROGRESS NOTES
PRIMARY DIAGNOSIS: PNEUMONIA  OUTPATIENT/OBSERVATION GOALS TO BE MET BEFORE DISCHARGE:  1. Dyspnea improved and O2 sats >88% on RA or back to baseline O2 levels: NO  SpO2: 93 %, O2 Device: Nasal cannula    2. Tolerating oral abx or appropriate plans made outpatient infusion: NO    3. Vitals signs normal or return to baseline: YES    4. Short term supplemental O2 needed with activity at home: NO    5. Tolerate oral intake to maintain hydration: YES    6. Return to near baseline physical activity: Yes    Discharge Planner Nurse   Safe discharge environment identified: NO  Barriers to discharge: YES       Entered by: Shelia Pruett RN 07/20/2023 5:50 AM

## 2023-07-20 NOTE — PROGRESS NOTES
[facility]    Medicine Progress Note - Hospitalist Service    Date of Admission:  7/19/2023    Assessment & Plan   Abdirahman Dyer is a 43 year old male admitted history of childhood asthma is admitted on 7/19/2023 with newly reported shortness of breath, cough/wheezing and exertional hypoxia refractory to therapy in the last month.        Acute exertional hypoxia with bronchospasm  Likely asthma  * Patient presented to the emergency room with increasing shortness of breath but by history started shortly after 4 July.  * had noted decrease in endurance and increasing shortness of breath as outlined in HPI.  At baseline he is very active doing multiple athletic things but has not been able to do any of these activities given his shortness of breath.  * He was seen in the clinic in urgent care 7/11 and reported some bronchospasm at that point.  Was started on prednisone 40 mg for 5 days, albuterol and doxycycline. He felt like he never really got better.  *Over the last few days his breathing has gotten excessively worse.He can hear himself wheeze in association with continued coughing.  Feels chest tightness as well.  Prior to the last 2 weeks he had no prior respiratory symptoms since childhood.  * He has had 2 chest x-rays including in the ER which were negative.  * D-dimer 0.42.  Procal is 0.04  * CT chest shows- Right lower lobe predominant tree-in-bud/micronodularity compatible with infectious/inflammatory etiologies (e.g., infectious bronchiolitis or aspiration) No pneumohorax or pleural effusion.  * flu, RSV and COVID19 negative  * pulmonary evaluated, clinical hx consistent with asthma per pulmonary.   - stop antibiotics given negative procal. Also has completed course of doxycyline as OP.   - Breo started per pulmonary  - continue with IV solumedrol BID, likely to PO tomorrow pending clinical course  - IS   - ambulation as tolerated  - wean off oxygen         Diet: Regular Diet  Adult    DVT Prophylaxis: Ambulate every shift  Chavez Catheter: Not present  Lines: None     Cardiac Monitoring: None  Code Status: Full Code      Clinically Significant Risk Factors Present on Admission                    # Acute Respiratory Failure: Documented O2 saturation < 91%.  Continue supplemental oxygen as needed              Disposition Plan  likely in the next 24 hrs if continued improvement and weaned off oxygen     Expected Discharge Date: 07/21/2023                  Estrella Briceno MD  Hospitalist Service  [facility]  Securely message with [employer] (more info)  Text page via [employer] Paging/Directory   ______________________________________________________________________    Interval History   Patient reports he is improving. He says he is about 85% better.   He is afebrile.     Physical Exam   Vital Signs: Temp: 98.2  F (36.8  C) Temp src: Oral BP: 119/80 Pulse: 88   Resp: 18 SpO2: 93 % O2 Device: Nasal cannula Oxygen Delivery: 3 LPM (3.5LPM)  Weight: 0 lbs 0 oz    General Appearance: Alert, awake and no apparent distress  Respiratory: bilateral exp wheezing  Cardiovascular: regular rate and rhythm  GI: soft and non-tender  Skin: warm and dry      Medical Decision Making       40 MINUTES SPENT BY ME on the date of service doing chart review, history, exam, documentation & further activities per the note.  MANAGEMENT DISCUSSED with the following over the past 24 hours: patient and RN   NOTE(S)/MEDICAL RECORDS REVIEWED over the past 24 hours: pulmoary note  Tests ORDERED & REVIEWED in the past 24 hours:  - BMP  - CBC  Tests personally interpreted in the past 24 hours:  - CT chest showing as above      Data     I have personally reviewed the following data over the past 24 hrs:    12.8 (H)  \   16.3   / 392     138 101 20.5 (H) /  107 (H)   4.8 25 0.96 \       Trop: <6 BNP: <36       Procal: 0.04 CRP: N/A Lactic Acid: 1.5       INR:  N/A PTT:  N/A   D-dimer:  0.42 Fibrinogen:   N/A       Imaging results reviewed over the past 24 hrs:   Recent Results (from the past 24 hour(s))   XR Chest 2 Views    Narrative    EXAM: XR CHEST 2 VIEWS  LOCATION: [facility]  DATE: 7/19/2023    INDICATION: 10 days of minimally productive cough, wheezing, new shortness of breath for the past 24 hours with chest tightness  COMPARISON: 08/27/2004.      Impression    IMPRESSION: Negative chest.   CT Chest w/o Contrast    Narrative    EXAM: CT CHEST W/O CONTRAST  LOCATION: [facility]  DATE: 7/19/2023    INDICATION: unexplained hypoxia, coughing and wheezes  COMPARISON: Chest x-ray dated 07/19/2023  TECHNIQUE: CT chest without IV contrast. Multiplanar reformats were obtained. Dose reduction techniques were used.  CONTRAST: None.    FINDINGS:   LUNGS AND PLEURA: Right lower lobe tree-in-bud nodularity with micronodularity. Mild associated bilateral bronchial wall thickening with mild retained secretions.. No lobar consolidation, effusion, or pneumothorax.    MEDIASTINUM/AXILLAE: Multiple subcentimeter mediastinal nodes, likely reactive in the setting of infection.    CORONARY ARTERY CALCIFICATION: None.    UPPER ABDOMEN: No acute abnormalities    MUSCULOSKELETAL: No acute bony abnormalities.      Impression    IMPRESSION:   1.  Right lower lobe predominant tree-in-bud/micronodularity compatible with infectious/inflammatory etiologies (e.g., infectious bronchiolitis or aspiration).  2.  No pneumothorax or pleural effusion.

## 2023-07-21 VITALS
SYSTOLIC BLOOD PRESSURE: 144 MMHG | RESPIRATION RATE: 23 BRPM | OXYGEN SATURATION: 90 % | DIASTOLIC BLOOD PRESSURE: 94 MMHG | TEMPERATURE: 97.8 F | HEART RATE: 104 BPM

## 2023-07-21 PROCEDURE — 94640 AIRWAY INHALATION TREATMENT: CPT | Mod: 76

## 2023-07-21 PROCEDURE — 250N000009 HC RX 250: Performed by: INTERNAL MEDICINE

## 2023-07-21 PROCEDURE — 99239 HOSP IP/OBS DSCHRG MGMT >30: CPT | Performed by: INTERNAL MEDICINE

## 2023-07-21 PROCEDURE — 99233 SBSQ HOSP IP/OBS HIGH 50: CPT | Performed by: STUDENT IN AN ORGANIZED HEALTH CARE EDUCATION/TRAINING PROGRAM

## 2023-07-21 PROCEDURE — 250N000011 HC RX IP 250 OP 636: Mod: JZ | Performed by: INTERNAL MEDICINE

## 2023-07-21 PROCEDURE — 94640 AIRWAY INHALATION TREATMENT: CPT

## 2023-07-21 PROCEDURE — 250N000012 HC RX MED GY IP 250 OP 636 PS 637: Performed by: STUDENT IN AN ORGANIZED HEALTH CARE EDUCATION/TRAINING PROGRAM

## 2023-07-21 RX ORDER — ALBUTEROL SULFATE 90 UG/1
1-2 AEROSOL, METERED RESPIRATORY (INHALATION) EVERY 4 HOURS PRN
Qty: 18 G | Refills: 1 | Status: SHIPPED | OUTPATIENT
Start: 2023-07-21 | End: 2023-11-15

## 2023-07-21 RX ORDER — PREDNISONE 20 MG/1
20 TABLET ORAL DAILY
Status: DISCONTINUED | OUTPATIENT
Start: 2023-07-29 | End: 2023-07-21 | Stop reason: HOSPADM

## 2023-07-21 RX ORDER — PREDNISONE 20 MG/1
40 TABLET ORAL DAILY
Status: DISCONTINUED | OUTPATIENT
Start: 2023-07-21 | End: 2023-07-21 | Stop reason: HOSPADM

## 2023-07-21 RX ORDER — FLUTICASONE PROPIONATE AND SALMETEROL 250; 50 UG/1; UG/1
1 POWDER RESPIRATORY (INHALATION) EVERY 12 HOURS
Qty: 60 EACH | Refills: 0 | Status: SHIPPED | OUTPATIENT
Start: 2023-07-21 | End: 2023-11-15

## 2023-07-21 RX ORDER — METHYLPREDNISOLONE SODIUM SUCCINATE 125 MG/2ML
60 INJECTION, POWDER, LYOPHILIZED, FOR SOLUTION INTRAMUSCULAR; INTRAVENOUS EVERY 12 HOURS
Status: DISCONTINUED | OUTPATIENT
Start: 2023-07-21 | End: 2023-07-21

## 2023-07-21 RX ORDER — PREDNISONE 10 MG/1
TABLET ORAL
Qty: 38 TABLET | Refills: 0 | Status: SHIPPED | OUTPATIENT
Start: 2023-07-21 | End: 2023-08-04

## 2023-07-21 RX ORDER — PREDNISONE 5 MG/1
10 TABLET ORAL DAILY
Status: DISCONTINUED | OUTPATIENT
Start: 2023-08-01 | End: 2023-07-21 | Stop reason: HOSPADM

## 2023-07-21 RX ORDER — FLUTICASONE FUROATE AND VILANTEROL 200; 25 UG/1; UG/1
1 POWDER RESPIRATORY (INHALATION) DAILY
Qty: 60 EACH | Refills: 0 | Status: SHIPPED | OUTPATIENT
Start: 2023-07-22 | End: 2023-07-21

## 2023-07-21 RX ADMIN — PREDNISONE 40 MG: 20 TABLET ORAL at 15:48

## 2023-07-21 RX ADMIN — ALBUTEROL SULFATE 2.5 MG: 2.5 SOLUTION RESPIRATORY (INHALATION) at 08:23

## 2023-07-21 RX ADMIN — ALBUTEROL SULFATE 2.5 MG: 2.5 SOLUTION RESPIRATORY (INHALATION) at 13:54

## 2023-07-21 RX ADMIN — FLUTICASONE FUROATE AND VILANTEROL TRIFENATATE 1 PUFF: 200; 25 POWDER RESPIRATORY (INHALATION) at 09:16

## 2023-07-21 RX ADMIN — METHYLPREDNISOLONE SODIUM SUCCINATE 62.5 MG: 125 INJECTION, POWDER, FOR SOLUTION INTRAMUSCULAR; INTRAVENOUS at 06:26

## 2023-07-21 ASSESSMENT — ACTIVITIES OF DAILY LIVING (ADL)
ADLS_ACUITY_SCORE: 18

## 2023-07-21 NOTE — PROVIDER NOTIFICATION
"Hospitalist notified -     \"FYI - Trialed pt off oxygen, he is satting at 90% at rest, 88% while ambulating down the cox. Does not report any SOB or dizziness.\"         "

## 2023-07-21 NOTE — CONSULTS
Patient has "[employer] ([employer]) through an employer.    LABA/ICS    Dulera $90/mo.*   Symbicort Not covered   Breo $90/mo.   Advair $10/mo.   AirDuo generic Not covered     *Savings card at [email or web address] reduces this to $15/mo.    Bianca Stratton  Pharmacy Technician/Liaison, Discharge Pharmacy   [phone]  [email or web address]

## 2023-07-21 NOTE — PLAN OF CARE
[id number];    Pt A/Ox4, BARBER, PERLLA, SR/ST, -130's, Afebrile, L/S fine crackles to dim, productive cough on 3L NC. Sputum trap place into wall suction in case a sample is viable for use. Pt walked independently with supplemental oxygen, no desat noted. Pt becomes tachy post Neb treatment, Pt regular diet, up ad chemo.

## 2023-07-21 NOTE — PROGRESS NOTES
Patient has been assessed for Home Oxygen needs. Oxygen readings:    *Pulse oximetry (SpO2) = 90% on room air at rest while awake.    *SpO2 improved to 94% on 2 liters/minute at rest.    *SpO2 = 88% on room air during activity/with exercise.    *SpO2 improved to 93% on 2 liters/minute during activity/with exercise.

## 2023-07-21 NOTE — PLAN OF CARE
Pt admitted with hypoxia.  Alert and oriented x4.  VSS - titrated to room air at rest (see O2 note).  Home O2 ordered for activity.  Lung sounds with expiratory and inspiratory wheezing.  Ambulating independently.  Reviewed written discharge instructions including new medications, signs of worsening respiratory status, and follow-up appointments.  Pt discharged home.

## 2023-07-21 NOTE — PROGRESS NOTES
Pulmonary Consult Note    Date of Service: 07/21/23    Assessment and Recommendations:  43M admitted 7/19 w/ dyspnea, cough, wheezing, and exertional hypoxemia. Clinical history consistent with asthma. Likely triggered by viral illness vs poor air-quality vs allergies. Hypoxemia 2/2 asthma exacerbation and hypoventilation.     - continue ICS-LABA (Breo), rinse mouth out after use  - discharge on ICS-LABA per insurance formulary  - continue prn albuterol on discharge  - transition methylprednisolone to prednisone  - prednisone taper; 40mg x 5d, 30mg x 3d, 20mg x 3d, 10mg x 3d  - OK to discharge from a pulmonary perspective on home O2, pt is comfortable with this plan  - my clinic will reach out to him to schedule follow-up    Chief Complaint   Patient presents with     Cough       Interval Hx:  Feeling well. Denies PULIDO or SOB at rest. Cough w/ tan sputum. O2 down to 2L. Notably, O2 ~92% while resting, when asked to take deep breaths, O2 up to 98%.     10 point review of systems negative, aside from that mentioned above    /81 (BP Location: Right arm)   Pulse 68   Temp 97.6  F (36.4  C) (Oral)   Resp 18   SpO2 95%   Gen: NAD  HEENT: anicteric, OP clear, Mallampati III  Card: RRR  Pulm: clear bilaterally   Abd: soft, NTND  MSK: no LE edema, no acute joint abnormalities  Skin: no obvious rash  Psych: normal affect  Neuro: answering questions appropriately     Labs: personally reviewed  Abs eos (7/2023) - 100     Imaging: personally reviewed    PMH: asthma     PSH: reviewed and non-contributory     FHx: reviewed and non-contributory     Social History     Socioeconomic History     Marital status: Single     Spouse name: Not on file     Number of children: Not on file     Years of education: Not on file     Highest education level: Not on file   Occupational History     Not on file   Tobacco Use     Smoking status: Not on file     Smokeless tobacco: Not on file   Substance and Sexual Activity     Alcohol use:  Not on file     Drug use: Not on file     Sexual activity: Not on file   Other Topics Concern     Not on file   Social History Narrative     Not on file     Social Determinants of Health     Financial Resource Strain: Not on file   Food Insecurity: Not on file   Transportation Needs: Not on file   Physical Activity: Not on file   Stress: Not on file   Social Connections: Not on file   Intimate Partner Violence: Not on file   Housing Stability: Not on file       Campos Trevino MD  Pulmonary and Critical Care Medicine  [facility]

## 2023-07-21 NOTE — DISCHARGE SUMMARY
[facility]  Hospitalist Discharge Summary      Date of Admission:  7/19/2023  Date of Discharge:  7/21/2023  Discharging Provider: Estrella Briceno MD  Discharge Service: Hospitalist Service    Discharge Diagnoses   See below    Clinically Significant Risk Factors          Follow-ups Needed After Discharge   Follow-up Appointments     Follow-up and recommended labs and tests       1. Follow up with pulmonologist. The clinic will call you to arrange   appointment.   2. Follow up with primary care provider, Kevin Ge, within 7   days for hospital follow- up.  No follow up labs or test are needed.            Unresulted Labs Ordered in the Past 30 Days of this Admission     Date and Time Order Name Status Description    7/19/2023  9:31 PM Blood Culture Peripheral Blood Preliminary     7/19/2023  9:31 PM Blood Culture Peripheral Blood Preliminary       These results will be followed up by     Discharge Disposition   Discharged to home  Condition at discharge: Stable    Hospital Course   Abdirahman Dyer is a 43 year old male admitted history of childhood asthma is admitted on 7/19/2023 with newly reported shortness of breath, cough/wheezing and exertional hypoxia refractory to therapy in the last month.        Acute exertional hypoxia with bronchospasm  Likely asthma  * Patient presented to the emergency room with increasing shortness of breath but by history started shortly after 4 July.  * had noted decrease in endurance and increasing shortness of breath as outlined in HPI.  At baseline he is very active doing multiple athletic things but has not been able to do any of these activities given his shortness of breath.  * He was seen in the clinic in urgent care 7/11 and reported some bronchospasm at that point.  Was started on prednisone 40 mg for 5 days, albuterol and doxycycline. He felt like he never really got better.  *Over the last few days his breathing has gotten  excessively worse.He can hear himself wheeze in association with continued coughing.  Feels chest tightness as well.  Prior to the last 2 weeks he had no prior respiratory symptoms since childhood.  * He has had 2 chest x-rays including in the ER which were negative.  * D-dimer 0.42.  Procal is 0.04  * CT chest shows- Right lower lobe predominant tree-in-bud/micronodularity compatible with infectious/inflammatory etiologies (e.g., infectious bronchiolitis or aspiration) No pneumohorax or pleural effusion.  * flu, RSV and COVID19 negative  * pulmonary evaluated, clinical hx consistent with asthma per pulmonary.   * stop antibiotics given negative procal. Also has completed course of doxycyline as OP.   * treated with IV Solumedrol and Breo started by pulmonary  * patient feels overall improved. He is weaned off oxygen at rest, but desats with activity. Patient is ok with discharging home on oxygen. Also discussed with pulmonary.   - will discharge on prednisone taper per pulm recs--> 40mg x 5d, 30mg x 3d, 20mg x 3d, 10mg x 3d  - started on Breo ellipita in hospital, but will discharge on Advair 250/50 due to better insurance coverage  - continue prn albuterol at discharge  - Pulmonary Clinic will arrange follow up appointment after discharge  - 2L oxygen with activity prescribed for discharge    Oxygen Documentation  I certify that this patient, Abdirahman Dyer has been under my care (or a nurse practitioner or physican's assistant working with me). This is the face-to-face encounter for oxygen medical necessity.      At the time of this encounter supplemental oxygen is reasonable and necessary and is expected to improve the patient's condition in a home setting.       Patient has continued oxygen desaturation due to Moderate Persistent Asthma J45.40.    If portability is ordered, is the patient mobile within the home? yes            Consultations This Hospital Stay   PULMONARY IP CONSULT  PHARMACY LIAISON FOR  MEDICATION COVERAGE CONSULT    Code Status   Full Code    Time Spent on this Encounter   I, Estrella Briceno MD, personally saw the patient today and spent 40 minutes discharging this patient.       Estrella Briceno MD  [facility] NEUROSCIENCE UNIT  [address] [id number]  Phone: [phone]  ______________________________________________________________________    Physical Exam   Vital Signs: Temp: 97.8  F (36.6  C) Temp src: Oral BP: (!) 144/94 Pulse: 104   Resp: 23 SpO2: 90 % O2 Device: None (Room air) Weight: 0 lbs 0 oz  General Appearance: Alert, awake and no apparent distress  Respiratory: clear to ausculation bilaterally, no wheezing  Cardiovascular: regular rate and rhythm  GI: soft and non-tender  Skin: warm and dry          Primary Care Physician   Kevin Ge    Discharge Orders      Reason for your hospital stay    You were admitted to the hospital likely secondary to Asthma.     Follow-up and recommended labs and tests     1. Follow up with pulmonologist. The clinic will call you to arrange appointment.   2. Follow up with primary care provider, Kevin Ge, within 7 days for hospital follow- up.  No follow up labs or test are needed.     Activity    Your activity upon discharge: activity as tolerated     Oxygen Adult/Peds    Oxygen Documentation  I certify that this patient, Abdirahman Dyer has been under my care (or a nurse practitioner or physican's assistant working with me). This is the face-to-face encounter for oxygen medical necessity.      At the time of this encounter supplemental oxygen is reasonable and necessary and is expected to improve the patient's condition in a home setting.       Patient has continued oxygen desaturation due to Moderate Persistent Asthma J45.40.    If portability is ordered, is the patient mobile within the home? yes     Diet    Follow this diet upon discharge: Orders Placed This Encounter      Regular  Diet Adult       Significant Results and Procedures   Most Recent 3 CBC's:  Recent Labs   Lab Test 07/19/23  1851   WBC 12.8*   HGB 16.3   MCV 89        Most Recent 3 BMP's:  Recent Labs   Lab Test 07/19/23  1851      POTASSIUM 4.8   CHLORIDE 101   CO2 25   BUN 20.5*   CR 0.96   ANIONGAP 12   BRANDI 10.1*   *   ,   Results for orders placed or performed during the hospital encounter of 07/19/23   XR Chest 2 Views    Narrative    EXAM: XR CHEST 2 VIEWS  LOCATION: [facility]  DATE: 7/19/2023    INDICATION: 10 days of minimally productive cough, wheezing, new shortness of breath for the past 24 hours with chest tightness  COMPARISON: 08/27/2004.      Impression    IMPRESSION: Negative chest.   CT Chest w/o Contrast    Narrative    EXAM: CT CHEST W/O CONTRAST  LOCATION: [facility]  DATE: 7/19/2023    INDICATION: unexplained hypoxia, coughing and wheezes  COMPARISON: Chest x-ray dated 07/19/2023  TECHNIQUE: CT chest without IV contrast. Multiplanar reformats were obtained. Dose reduction techniques were used.  CONTRAST: None.    FINDINGS:   LUNGS AND PLEURA: Right lower lobe tree-in-bud nodularity with micronodularity. Mild associated bilateral bronchial wall thickening with mild retained secretions.. No lobar consolidation, effusion, or pneumothorax.    MEDIASTINUM/AXILLAE: Multiple subcentimeter mediastinal nodes, likely reactive in the setting of infection.    CORONARY ARTERY CALCIFICATION: None.    UPPER ABDOMEN: No acute abnormalities    MUSCULOSKELETAL: No acute bony abnormalities.      Impression    IMPRESSION:   1.  Right lower lobe predominant tree-in-bud/micronodularity compatible with infectious/inflammatory etiologies (e.g., infectious bronchiolitis or aspiration).  2.  No pneumothorax or pleural effusion.           Discharge Medications   Current Discharge Medication List      START taking these medications    Details    fluticasone-salmeterol (ADVAIR) 250-50 MCG/ACT inhaler Inhale 1 puff into the lungs every 12 hours  Qty: 60 each, Refills: 0    Associated Diagnoses: Asthma with acute exacerbation, unspecified asthma severity, unspecified whether persistent      predniSONE (DELTASONE) 10 MG tablet Take 4 tablets (40 mg) by mouth daily for 5 days, THEN 3 tablets (30 mg) daily for 3 days, THEN 2 tablets (20 mg) daily for 3 days, THEN 1 tablet (10 mg) daily for 3 days.  Qty: 38 tablet, Refills: 0    Associated Diagnoses: Acute bronchospasm         CONTINUE these medications which have CHANGED    Details   albuterol (PROAIR HFA/PROVENTIL HFA/VENTOLIN HFA) 108 (90 Base) MCG/ACT inhaler Inhale 1-2 puffs into the lungs every 4 hours as needed for shortness of breath, wheezing or cough  Qty: 18 g, Refills: 1    Comments: Pharmacy may dispense brand covered by insurance (Proair, or proventil or ventolin or generic albuterol inhaler)  Associated Diagnoses: Asthma with acute exacerbation, unspecified asthma severity, unspecified whether persistent         CONTINUE these medications which have NOT CHANGED    Details   fluticasone (FLONASE) 50 MCG/ACT nasal spray Spray 1 spray into both nostrils daily as needed for rhinitis or allergies      ibuprofen (ADVIL/MOTRIN) 200 MG tablet Take 200 mg by mouth 2 times daily as needed for pain      methylphenidate HCL ER, OSM, (CONCERTA) 27 MG CR tablet Take 27 mg by mouth every morning         STOP taking these medications       doxycycline hyclate (VIBRAMYCIN) 100 MG capsule Comments:   Reason for Stopping:             Allergies   Allergies   Allergen Reactions     Banana Anaphylaxis

## 2023-07-24 DIAGNOSIS — J45.909 ASTHMA: Primary | ICD-10-CM

## 2023-07-24 LAB — BACTERIA BLD CULT: NO GROWTH

## 2023-07-25 LAB — BACTERIA BLD CULT: NO GROWTH

## 2023-11-14 NOTE — PROGRESS NOTES
Pulmonary Clinic Note    Date of Service: 11/15/2023     Chief Complaint   Patient presents with    Asthma Consult       A/P:  43M being seen for post-hospitalization follow up for asthma. PFTs w/ mild restriction (likely weight related) and normal FeNO. He is minimally symptomatic, but not back to baseline. He did not use his maintenance inhaler for very long and I do not feel we saw full benefit. I would like to give this a better trial to assess response. We discussed that deconditioning from hospitalization and post-hospital recovery is likely contributing.     - restart ICS-LABA (Advair) twice daily, rinse mouth out after use  - continue prn albuterol   - OK to substitute medications based on formulary     History:  43M being seen for asthma. He is prescribed ICS-LABA (Advair) and prn albuterol. I saw this patient as a consult 7/2023 when he was admitted to [facility] 7/19-7/21 for asthma exacerbation. He was started on ICS-LABA that admission and discharged on prednisone taper. Was discharged on O2 2L, he hasn't used it in months. Breathing feels pretty good. Able to run 5mi, does 25-30mi bike rides. Is noticeably slower and feels he is breathing harder. No SOB at rest. Rare chest tightness. Does hear wheezing. Coughing more w/ weather change, yellow sputum. Cough wakes from sleep at most once per week. No orthopnea or PND. No LE edema. Did notice a difference w/ Advair, so he stopped taking it. Has not used albuterol recently, didn't find it particularly helpful.     Smoking: never  Bird exposure: no             Animal exposure: dog        Inhalation exposure: no    Occupation: digital marketing for [employer]                          10 point review of systems negative, aside from that mentioned in HPI.    /86 (BP Location: Left arm, Patient Position: Sitting, Cuff Size: Adult Large)   Pulse 71   Resp 18   Wt 106.5 kg (234 lb 11.2 oz)   SpO2 97%   Gen: well-appearing  HEENT: Mallampati  I  Card: RRR  Pulm: clear bilaterally   Abd: soft  MSK: no edema, no acute joint abnormality   Skin: no obvious rash  Psych: normal affect  Neuro: alert and oriented     Labs:  Personally reviewed  Abs eos (7/2023) - 100      Imaging/Studies: Personally reviewed  PFTs (11/2023) - mild restriction, normal DLCOunc, FeNO 17  CT Chest (7/2023) - RLL tree-in-bud/micronodularity     PMH: asthma  PSH: reviewed and non-contributory  FHx: reviewed and non-contributory   Social History     Socioeconomic History    Marital status: Single     Spouse name: Not on file    Number of children: Not on file    Years of education: Not on file    Highest education level: Not on file   Occupational History    Not on file   Tobacco Use    Smoking status: Never     Passive exposure: Never    Smokeless tobacco: Never   Vaping Use    Vaping Use: Never used   Substance and Sexual Activity    Alcohol use: Not on file    Drug use: Not on file    Sexual activity: Not on file   Other Topics Concern    Not on file   Social History Narrative    Not on file     Social Determinants of Health     Financial Resource Strain: Not on file   Food Insecurity: Not on file   Transportation Needs: Not on file   Physical Activity: Not on file   Stress: Not on file   Social Connections: Not on file   Interpersonal Safety: Not on file   Housing Stability: Not on file       35 minutes spent reviewing chart, reviewing test results, talking with and examining patient, formulating plan, and documentation on the day of the encounter.    Campos Trevino MD  Pulmonary and Critical Care Medicine  [facility]

## 2023-11-15 ENCOUNTER — OFFICE VISIT (OUTPATIENT)
Dept: PULMONOLOGY | Facility: CLINIC | Age: 43
End: 2023-11-15
Payer: COMMERCIAL

## 2023-11-15 VITALS
DIASTOLIC BLOOD PRESSURE: 86 MMHG | HEART RATE: 71 BPM | RESPIRATION RATE: 18 BRPM | WEIGHT: 234.7 LBS | OXYGEN SATURATION: 97 % | SYSTOLIC BLOOD PRESSURE: 134 MMHG

## 2023-11-15 DIAGNOSIS — J45.909 ASTHMA: ICD-10-CM

## 2023-11-15 DIAGNOSIS — J45.40 MODERATE PERSISTENT ASTHMA WITHOUT COMPLICATION: Primary | ICD-10-CM

## 2023-11-15 LAB — PULMONARY FUNCTION TEST-FENO: 17 PPB (ref 0–40)

## 2023-11-15 PROCEDURE — 94726 PLETHYSMOGRAPHY LUNG VOLUMES: CPT | Performed by: INTERNAL MEDICINE

## 2023-11-15 PROCEDURE — 94729 DIFFUSING CAPACITY: CPT | Performed by: INTERNAL MEDICINE

## 2023-11-15 PROCEDURE — 94375 RESPIRATORY FLOW VOLUME LOOP: CPT | Performed by: INTERNAL MEDICINE

## 2023-11-15 PROCEDURE — 95012 NITRIC OXIDE EXP GAS DETER: CPT | Performed by: INTERNAL MEDICINE

## 2023-11-15 PROCEDURE — 99214 OFFICE O/P EST MOD 30 MIN: CPT | Mod: 25 | Performed by: STUDENT IN AN ORGANIZED HEALTH CARE EDUCATION/TRAINING PROGRAM

## 2023-11-15 RX ORDER — ALBUTEROL SULFATE 90 UG/1
1-2 AEROSOL, METERED RESPIRATORY (INHALATION) EVERY 4 HOURS PRN
Qty: 18 G | Refills: 11 | Status: SHIPPED | OUTPATIENT
Start: 2023-11-15

## 2023-11-15 RX ORDER — LOSARTAN POTASSIUM 50 MG/1
50 TABLET ORAL DAILY
COMMUNITY

## 2023-11-15 RX ORDER — FLUTICASONE PROPIONATE AND SALMETEROL 250; 50 UG/1; UG/1
1 POWDER RESPIRATORY (INHALATION) EVERY 12 HOURS
Qty: 60 EACH | Refills: 3 | Status: SHIPPED | OUTPATIENT
Start: 2023-11-15

## 2023-11-15 ASSESSMENT — ASTHMA QUESTIONNAIRES
HOSPITALIZATION_OVERNIGHT_LAST_YEAR_TOTAL: ONE
ACUTE_EXACERBATION_TODAY: NO
ACT_TOTALSCORE: 18
ACT_TOTALSCORE: 18
QUESTION_2 LAST FOUR WEEKS HOW OFTEN HAVE YOU HAD SHORTNESS OF BREATH: ONCE A DAY
QUESTION_5 LAST FOUR WEEKS HOW WOULD YOU RATE YOUR ASTHMA CONTROL: WELL CONTROLLED
EMERGENCY_ROOM_LAST_YEAR_TOTAL: ONE
QUESTION_4 LAST FOUR WEEKS HOW OFTEN HAVE YOU USED YOUR RESCUE INHALER OR NEBULIZER MEDICATION (SUCH AS ALBUTEROL): NOT AT ALL
QUESTION_1 LAST FOUR WEEKS HOW MUCH OF THE TIME DID YOUR ASTHMA KEEP YOU FROM GETTING AS MUCH DONE AT WORK, SCHOOL OR AT HOME: NONE OF THE TIME
QUESTION_3 LAST FOUR WEEKS HOW OFTEN DID YOUR ASTHMA SYMPTOMS (WHEEZING, COUGHING, SHORTNESS OF BREATH, CHEST TIGHTNESS OR PAIN) WAKE YOU UP AT NIGHT OR EARLIER THAN USUAL IN THE MORNING: TWO OR THREE NIGHTS A WEEK

## 2023-11-15 ASSESSMENT — PAIN SCALES - GENERAL: PAINLEVEL: NO PAIN (0)

## 2023-11-15 NOTE — LETTER
11/15/2023         RE: Abdirahman Dyer  [address] [facility] [address]        Dear Colleague,    Thank you for referring your patient, Abdirahman Dyer, to the [facility] [place]. Please see a copy of my visit note below.    Pulmonary Clinic Note    Date of Service: 11/15/2023     Chief Complaint   Patient presents with     Asthma Consult       A/P:  43M being seen for post-hospitalization follow up for asthma. PFTs w/ mild restriction (likely weight related) and normal FeNO. He is minimally symptomatic, but not back to baseline. He did not use his maintenance inhaler for very long and I do not feel we saw full benefit. I would like to give this a better trial to assess response. We discussed that deconditioning from hospitalization and post-hospital recovery is likely contributing.     - restart ICS-LABA (Advair) twice daily, rinse mouth out after use  - continue prn albuterol   - OK to substitute medications based on formulary     History:  43M being seen for asthma. He is prescribed ICS-LABA (Advair) and prn albuterol. I saw this patient as a consult 7/2023 when he was admitted to [facility] 7/19-7/21 for asthma exacerbation. He was started on ICS-LABA that admission and discharged on prednisone taper. Was discharged on O2 2L, he hasn't used it in months. Breathing feels pretty good. Able to run 5mi, does 25-30mi bike rides. Is noticeably slower and feels he is breathing harder. No SOB at rest. Rare chest tightness. Does hear wheezing. Coughing more w/ weather change, yellow sputum. Cough wakes from sleep at most once per week. No orthopnea or PND. No LE edema. Did notice a difference w/ Advair, so he stopped taking it. Has not used albuterol recently, didn't find it particularly helpful.     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    /86 (BP Location: Left arm, Patient Position: Sitting, Cuff Size: Adult Large)   Pulse 71   Resp 18   Wt 106.5 kg (234 lb 11.2 oz)   SpO2 97%   Gen: well-appearing  HEENT: Mallampati I  Card: RRR  Pulm: clear bilaterally   Abd: soft  MSK: no edema, no acute joint abnormality   Skin: no obvious rash  Psych: normal affect  Neuro: alert and oriented     Labs:  Personally reviewed  Abs eos (7/2023) - 100      Imaging/Studies: Personally reviewed  PFTs (11/2023) - mild restriction, normal DLCOunc, FeNO 17  CT Chest (7/2023) - RLL tree-in-bud/micronodularity     PMH: asthma  PSH: reviewed and non-contributory  FHx: reviewed and non-contributory   Social History     Socioeconomic History     Marital status: Single     Spouse name: Not on file     Number of children: Not on file     Years of education: Not on file     Highest education level: Not on file   Occupational History     Not on file   Tobacco Use     Smoking status: Never     Passive exposure: Never     Smokeless tobacco: Never   Vaping Use     Vaping Use: Never used   Substance and Sexual Activity     Alcohol use: Not on file     Drug use: Not on file     Sexual activity: Not on file   Other Topics Concern     Not on file   Social History Narrative     Not on file     Social Determinants of Health     Financial Resource Strain: Not on file   Food Insecurity: Not on file   Transportation Needs: Not on file   Physical Activity: Not on file   Stress: Not on file   Social Connections: Not on file   Interpersonal Safety: Not on file   Housing Stability: Not on file       35 minutes spent reviewing chart, reviewing test results, talking with and examining patient, formulating plan, and documentation on the day of the encounter.    Campos Trevino MD  Pulmonary and Critical Care Medicine  [facility]       Again, thank you for allowing me to participate in the care of your patient.         Sincerely,        Campos Trevino MD

## 2023-11-15 NOTE — PROGRESS NOTES
Abdirahman Dyer comes into clinic today at the request of Dr. Trevino, Ordering Provider for PFT    This service provided today was under the supervising provider of the day Dr. Trevino, who was available if needed.    Vel Alfred, RT

## 2023-11-15 NOTE — PATIENT INSTRUCTIONS
Thank you for coming to pulmonary clinic. Your pulmonary function tests show mild restriction which I think is likely related to weight, nothing else is concerning. I would like you to trial Advair twice daily, rinse your mouth out after use, try this for at least two months. Use albuterol as needed for worsening shortness of breath or prior to activity. I will see you back in 3 months.

## 2023-11-16 LAB
DLCOUNC-%PRED-PRE: 100 %
DLCOUNC-PRE: 30.9 ML/MIN/MMHG
DLCOUNC-PRED: 30.69 ML/MIN/MMHG
ERV-%PRED-PRE: 70 %
ERV-PRE: 1.22 L
ERV-PRED: 1.73 L
EXPTIME-PRE: 4.74 SEC
FEF2575-%PRED-PRE: 98 %
FEF2575-PRE: 3.74 L/SEC
FEF2575-PRED: 3.78 L/SEC
FEFMAX-%PRED-PRE: 80 %
FEFMAX-PRE: 8.15 L/SEC
FEFMAX-PRED: 10.11 L/SEC
FEV1-%PRED-PRE: 91 %
FEV1-PRE: 3.52 L
FEV1FEV6-PRE: 84 %
FEV1FEV6-PRED: 81 %
FEV1FVC-PRE: 84 %
FEV1FVC-PRED: 81 %
FEV1SVC-PRE: 86 %
FEV1SVC-PRED: 78 %
FIFMAX-PRE: 6.36 L/SEC
FRCPLETH-%PRED-PRE: 74 %
FRCPLETH-PRE: 2.59 L
FRCPLETH-PRED: 3.45 L
FVC-%PRED-PRE: 87 %
FVC-PRE: 4.19 L
FVC-PRED: 4.78 L
IC-%PRED-PRE: 82 %
IC-PRE: 2.86 L
IC-PRED: 3.46 L
RVPLETH-%PRED-PRE: 67 %
RVPLETH-PRE: 1.37 L
RVPLETH-PRED: 2.03 L
TLCPLETH-%PRED-PRE: 76 %
TLCPLETH-PRE: 5.45 L
TLCPLETH-PRED: 7.13 L
VA-%PRED-PRE: 79 %
VA-PRE: 5.29 L
VC-%PRED-PRE: 82 %
VC-PRE: 4.08 L
VC-PRED: 4.97 L

## 2023-11-17 ENCOUNTER — APPOINTMENT (OUTPATIENT)
Dept: GENERAL RADIOLOGY | Facility: HOSPITAL | Age: 43
End: 2023-11-17
Payer: COMMERCIAL

## 2023-11-17 ENCOUNTER — HOSPITAL ENCOUNTER (EMERGENCY)
Facility: HOSPITAL | Age: 43
Discharge: HOME | End: 2023-11-17
Payer: COMMERCIAL

## 2023-11-17 VITALS
RESPIRATION RATE: 20 BRPM | HEART RATE: 147 BPM | TEMPERATURE: 98.9 F | HEIGHT: 67 IN | OXYGEN SATURATION: 96 % | BODY MASS INDEX: 30.61 KG/M2 | SYSTOLIC BLOOD PRESSURE: 149 MMHG | DIASTOLIC BLOOD PRESSURE: 123 MMHG | WEIGHT: 195 LBS

## 2023-11-17 DIAGNOSIS — Z00.8 MEDICAL CLEARANCE FOR INCARCERATION: Primary | ICD-10-CM

## 2023-11-17 PROCEDURE — 99283 EMERGENCY DEPT VISIT LOW MDM: CPT

## 2023-11-17 NOTE — ED PROVIDER NOTES
Subjective  History of Present Illness:    Chief Complaint: Medical clearance  History of Present Illness: This is a 43-year-old male patient comes into the ED via EMS and [employer] after being involved in a DUI.  EMS states that patient has altered mental status speaking loudly, sporadically and having issues with agitation.      Nurses Notes reviewed and agree, including vitals, allergies, social history and prior medical history.       Allergies:    Penicillins      No past surgical history on file.      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    REVIEW OF SYSTEMS: All systems reviewed and not pertinent unless noted.    Review of Systems   Unable to perform ROS: Other   Unwilling to answer questions    Objective    Physical Exam  Vitals and nursing note reviewed.   Constitutional:       Appearance: Normal appearance.   HENT:      Head: Normocephalic and atraumatic.   Eyes:      Extraocular Movements: Extraocular movements intact.      Pupils: Pupils are equal, round, and reactive to light.   Cardiovascular:      Rate and Rhythm: Regular rhythm. Tachycardia present.      Pulses: Normal pulses.      Heart sounds: Normal heart sounds.   Pulmonary:      Effort: Pulmonary effort is normal.      Breath sounds: Normal breath sounds.   Abdominal:      General: Bowel sounds are normal.      Palpations: Abdomen is soft.   Musculoskeletal:         General: Normal range of motion.      Cervical back: Normal range of motion and neck supple.   Skin:     General: Skin is warm and dry.       Capillary Refill: Capillary refill takes less than 2 seconds.   Neurological:      Mental Status: He is alert.      GCS: GCS eye subscore is 4. GCS verbal subscore is 5. GCS motor subscore is 6.      Sensory: Sensation is intact.      Motor: Motor function is intact.   Psychiatric:         Attention and Perception: Attention and perception normal.         Mood and Affect: Mood is anxious.         Speech: He is noncommunicative. Speech is rapid and pressured.         Behavior: Behavior is uncooperative, agitated and aggressive.           Procedures    ED Course:         Lab Results (last 24 hours)       ** No results found for the last 24 hours. **             No radiology results from the last 24 hrs     Medical Decision Making  This is a 43-year-old male patient comes into the ED via EMS and [employer] after being involved in a DUI.  EMS states that patient has altered mental status speaking loudly, sporadically and having issues with agitation.      DDX: includes but is not limited to: Medical clearance    Amount and/or Complexity of Data Reviewed  Discussion of management or test interpretation with external provider(s): Discussed assessment, treatment and plan with ER attending    Risk  Risk Details: I have discussed with patient the finding of the test preformed today. Patient has been diagnosed with medically cleared for incarceration and will be discharged to the [facility].  Patient requested to follow-up with her care provider within the next 7 days for reevaluation. Strict return precautions have been given and patient verbalizes understanding                  Final diagnoses:   Medical clearance for incarceration          Chris Clemente, APRN  11/17/23 1539

## 2024-02-07 ENCOUNTER — OFFICE VISIT (OUTPATIENT)
Dept: PULMONOLOGY | Facility: CLINIC | Age: 44
End: 2024-02-07
Attending: STUDENT IN AN ORGANIZED HEALTH CARE EDUCATION/TRAINING PROGRAM
Payer: COMMERCIAL

## 2024-02-07 VITALS
SYSTOLIC BLOOD PRESSURE: 130 MMHG | DIASTOLIC BLOOD PRESSURE: 85 MMHG | WEIGHT: 238 LBS | OXYGEN SATURATION: 97 % | RESPIRATION RATE: 16 BRPM | HEART RATE: 70 BPM

## 2024-02-07 DIAGNOSIS — J45.40 MODERATE PERSISTENT ASTHMA WITHOUT COMPLICATION: ICD-10-CM

## 2024-02-07 ASSESSMENT — ASTHMA QUESTIONNAIRES
QUESTION_5 LAST FOUR WEEKS HOW WOULD YOU RATE YOUR ASTHMA CONTROL: WELL CONTROLLED
QUESTION_4 LAST FOUR WEEKS HOW OFTEN HAVE YOU USED YOUR RESCUE INHALER OR NEBULIZER MEDICATION (SUCH AS ALBUTEROL): TWO OR THREE TIMES PER WEEK
QUESTION_3 LAST FOUR WEEKS HOW OFTEN DID YOUR ASTHMA SYMPTOMS (WHEEZING, COUGHING, SHORTNESS OF BREATH, CHEST TIGHTNESS OR PAIN) WAKE YOU UP AT NIGHT OR EARLIER THAN USUAL IN THE MORNING: NOT AT ALL
ACT_TOTALSCORE: 22
QUESTION_1 LAST FOUR WEEKS HOW MUCH OF THE TIME DID YOUR ASTHMA KEEP YOU FROM GETTING AS MUCH DONE AT WORK, SCHOOL OR AT HOME: NONE OF THE TIME
ACT_TOTALSCORE: 22
QUESTION_2 LAST FOUR WEEKS HOW OFTEN HAVE YOU HAD SHORTNESS OF BREATH: NOT AT ALL
HOSPITALIZATION_OVERNIGHT_LAST_YEAR_TOTAL: ONE

## 2024-02-07 NOTE — PATIENT INSTRUCTIONS
Continue Over the counter antihistamines as needed. If allergies get worse we can try montelukast  Continue advair as needed, or albuterol      Follow up with your primary care provider. You can see us as needed.

## 2024-02-07 NOTE — PROGRESS NOTES
Pulmonary Clinic Note    Date of Service: 2/7/2024     Chief Complaint   Patient presents with    Follow Up     Acute bronchospasm  Hypoxia           A/P:  43M with asthma. ACT score 22 today. Using Advair and albuterol as needed, continue. If allergies worsen despite OTC antihistamines or flonase we could consider adding montelukast. Since his symptoms are very mild he would like to continue to follow up with his PCP for his asthma, which is reasonable unless his symptoms worsen in which case he should return to pulmonary clinic for follow up.     History:  43M being seen for follow up asthma. Last seen 11/2023. In summary, he was admitted to [facility] 7/19-7/21 for asthma exacerbation. He was started on ICS-LABA that admission and discharged on prednisone taper and 2L supplemental oxygen but used it only briefly. SpO2 97% on room air today at rest. Able to run 5mi and bike 25-30mi. He is prescribed ICS-LABA (Advair) as needed. He stopped using Advair regularly because he didn't see benefit, but uses it as needed if he feels like his chest is heavier (maybe once every 2 weeks). Uses albuterol 1-2x per week prior to runs.   Spring allergies are starting early due to the warm weather. Has phlegm in his throat when he wakes up but resolves throughout the day. Takes OTC antihistamine and flonase PRN for allergies which usually work well.     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    /85   Pulse 70   Resp 16   Wt 108 kg (238 lb)   SpO2 97%   Gen: well-appearing  Card: RRR  Pulm: normal respiratory effort on room ai. Clear to auscultation without wheeze or crackles  Psych: normal affect  Neuro: alert and oriented     Labs:  Personally reviewed  Abs eos (7/2023) - 100       Imaging/Studies: Personally  reviewed  PFTs (11/2023) - mild restriction, normal DLCOunc, FeNO 17  CT Chest (7/2023) - RLL tree-in-bud/micronodularity      PMH: asthma  PSH: reviewed and non-contributory  FHx: reviewed and non-contributory   Social History     Socioeconomic History    Marital status: Single     Spouse name: Not on file    Number of children: Not on file    Years of education: Not on file    Highest education level: Not on file   Occupational History    Not on file   Tobacco Use    Smoking status: Never     Passive exposure: Never    Smokeless tobacco: Never   Vaping Use    Vaping Use: Never used   Substance and Sexual Activity    Alcohol use: Not on file    Drug use: Not on file    Sexual activity: Not on file   Other Topics Concern    Not on file   Social History Narrative    Not on file     Social Determinants of Health     Financial Resource Strain: Not on file   Food Insecurity: Not on file   Transportation Needs: Not on file   Physical Activity: Not on file   Stress: Not on file   Social Connections: Not on file   Interpersonal Safety: Not on file   Housing Stability: Not on file       30 minutes spent reviewing chart, reviewing test results, talking with and examining patient, formulating plan, and documentation on the day of the encounter.    Brynn Garza PA-C  Pulmonary and Critical Care Medicine  [facility]

## 2024-02-07 NOTE — LETTER
2/7/2024         RE: Abdirahman Dyer  [address] [place] [address]        Dear Colleague,    Thank you for referring your patient, Abdirahman Dyer, to the [facility] [place]. Please see a copy of my visit note below.    Pulmonary Clinic Note    Date of Service: 2/7/2024     Chief Complaint   Patient presents with    Follow Up     Acute bronchospasm  Hypoxia           A/P:  43M with asthma. ACT score 22 today. Using Advair and albuterol as needed, continue. If allergies worsen despite OTC antihistamines or flonase we could consider adding montelukast. Since his symptoms are very mild he would like to continue to follow up with his PCP for his asthma, which is reasonable unless his symptoms worsen in which case he should return to pulmonary clinic for follow up.     History:  43M being seen for follow up asthma. Last seen 11/2023. In summary, he was admitted to [facility] 7/19-7/21 for asthma exacerbation. He was started on ICS-LABA that admission and discharged on prednisone taper and 2L supplemental oxygen but used it only briefly. SpO2 97% on room air today at rest. Able to run 5mi and bike 25-30mi. He is prescribed ICS-LABA (Advair) as needed. He stopped using Advair regularly because he didn't see benefit, but uses it as needed if he feels like his chest is heavier (maybe once every 2 weeks). Uses albuterol 1-2x per week prior to runs.   Spring allergies are starting early due to the warm weather. Has phlegm in his throat when he wakes up but resolves throughout the day. Takes OTC antihistamine and flonase PRN for allergies which usually work well.     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    /85   Pulse 70   Resp 16   Wt 108 kg (238 lb)   SpO2 97%   Gen:  well-appearing  Card: RRR  Pulm: normal respiratory effort on room ai. Clear to auscultation without wheeze or crackles  Psych: normal affect  Neuro: alert and oriented     Labs:  Personally reviewed  Abs eos (7/2023) - 100       Imaging/Studies: Personally reviewed  PFTs (11/2023) - mild restriction, normal DLCOunc, FeNO 17  CT Chest (7/2023) - RLL tree-in-bud/micronodularity      PMH: asthma  PSH: reviewed and non-contributory  FHx: reviewed and non-contributory   Social History     Socioeconomic History    Marital status: Single     Spouse name: Not on file    Number of children: Not on file    Years of education: Not on file    Highest education level: Not on file   Occupational History    Not on file   Tobacco Use    Smoking status: Never     Passive exposure: Never    Smokeless tobacco: Never   Vaping Use    Vaping Use: Never used   Substance and Sexual Activity    Alcohol use: Not on file    Drug use: Not on file    Sexual activity: Not on file   Other Topics Concern    Not on file   Social History Narrative    Not on file     Social Determinants of Health     Financial Resource Strain: Not on file   Food Insecurity: Not on file   Transportation Needs: Not on file   Physical Activity: Not on file   Stress: Not on file   Social Connections: Not on file   Interpersonal Safety: Not on file   Housing Stability: Not on file       30 minutes spent reviewing chart, reviewing test results, talking with and examining patient, formulating plan, and documentation on the day of the encounter.    Brynn Garza PA-C  Pulmonary and Critical Care Medicine  [facility]       Again, thank you for allowing me to participate in the care of your patient.        Sincerely,        Campos Trevino MD

## 2024-07-26 ENCOUNTER — APPOINTMENT (OUTPATIENT)
Dept: CT IMAGING | Facility: HOSPITAL | Age: 44
DRG: 683 | End: 2024-07-26
Payer: COMMERCIAL

## 2024-07-26 ENCOUNTER — HOSPITAL ENCOUNTER (INPATIENT)
Facility: HOSPITAL | Age: 44
LOS: 2 days | Discharge: HOME | DRG: 683 | End: 2024-07-28
Attending: STUDENT IN AN ORGANIZED HEALTH CARE EDUCATION/TRAINING PROGRAM | Admitting: FAMILY MEDICINE
Payer: COMMERCIAL

## 2024-07-26 ENCOUNTER — APPOINTMENT (OUTPATIENT)
Dept: GENERAL RADIOLOGY | Facility: HOSPITAL | Age: 44
DRG: 683 | End: 2024-07-26
Payer: COMMERCIAL

## 2024-07-26 DIAGNOSIS — R07.9 CHEST PAIN, UNSPECIFIED TYPE: ICD-10-CM

## 2024-07-26 DIAGNOSIS — N17.9 ACUTE KIDNEY INJURY: Primary | ICD-10-CM

## 2024-07-26 DIAGNOSIS — E86.0 DEHYDRATION: ICD-10-CM

## 2024-07-26 DIAGNOSIS — D72.829 LEUKOCYTOSIS, UNSPECIFIED TYPE: ICD-10-CM

## 2024-07-26 LAB
ALBUMIN SERPL-MCNC: 5.1 G/DL (ref 3.5–5.2)
ALBUMIN/GLOB SERPL: 1.5 G/DL
ALP SERPL-CCNC: 188 U/L (ref 39–117)
ALT SERPL W P-5'-P-CCNC: 33 U/L (ref 1–41)
AMPHET+METHAMPHET UR QL: NEGATIVE
AMPHETAMINES UR QL: NEGATIVE
ANION GAP SERPL CALCULATED.3IONS-SCNC: 15.4 MMOL/L (ref 5–15)
AST SERPL-CCNC: 38 U/L (ref 1–40)
BACTERIA UR QL AUTO: ABNORMAL /HPF
BARBITURATES UR QL SCN: NEGATIVE
BASOPHILS # BLD AUTO: 0.09 10*3/MM3 (ref 0–0.2)
BASOPHILS NFR BLD AUTO: 0.6 % (ref 0–1.5)
BENZODIAZ UR QL SCN: NEGATIVE
BILIRUB SERPL-MCNC: 0.5 MG/DL (ref 0–1.2)
BILIRUB UR QL STRIP: NEGATIVE
BUN SERPL-MCNC: 24 MG/DL (ref 6–20)
BUN/CREAT SERPL: 7.8 (ref 7–25)
BUPRENORPHINE SERPL-MCNC: NEGATIVE NG/ML
CALCIUM SPEC-SCNC: 10.8 MG/DL (ref 8.6–10.5)
CANNABINOIDS SERPL QL: NEGATIVE
CHLORIDE SERPL-SCNC: 96 MMOL/L (ref 98–107)
CK MB SERPL-CCNC: 4.54 NG/ML
CK SERPL-CCNC: 322 U/L (ref 20–200)
CLARITY UR: ABNORMAL
CO2 SERPL-SCNC: 27.6 MMOL/L (ref 22–29)
COCAINE UR QL: NEGATIVE
COLOR UR: YELLOW
CREAT SERPL-MCNC: 3.07 MG/DL (ref 0.76–1.27)
DEPRECATED RDW RBC AUTO: 38.3 FL (ref 37–54)
EGFRCR SERPLBLD CKD-EPI 2021: 24.9 ML/MIN/1.73
EOSINOPHIL # BLD AUTO: 0.06 10*3/MM3 (ref 0–0.4)
EOSINOPHIL NFR BLD AUTO: 0.4 % (ref 0.3–6.2)
ERYTHROCYTE [DISTWIDTH] IN BLOOD BY AUTOMATED COUNT: 12.5 % (ref 12.3–15.4)
FENTANYL UR-MCNC: NEGATIVE NG/ML
GEN 5 2HR TROPONIN T REFLEX: <6 NG/L
GLOBULIN UR ELPH-MCNC: 3.5 GM/DL
GLUCOSE SERPL-MCNC: 127 MG/DL (ref 65–99)
GLUCOSE UR STRIP-MCNC: NEGATIVE MG/DL
GRAN CASTS URNS QL MICRO: ABNORMAL /LPF
HCT VFR BLD AUTO: 49.8 % (ref 37.5–51)
HGB BLD-MCNC: 17.4 G/DL (ref 13–17.7)
HGB UR QL STRIP.AUTO: ABNORMAL
HOLD SPECIMEN: NORMAL
HOLD SPECIMEN: NORMAL
HYALINE CASTS UR QL AUTO: ABNORMAL /LPF
IMM GRANULOCYTES # BLD AUTO: 0.1 10*3/MM3 (ref 0–0.05)
IMM GRANULOCYTES NFR BLD AUTO: 0.7 % (ref 0–0.5)
KETONES UR QL STRIP: ABNORMAL
LEUKOCYTE ESTERASE UR QL STRIP.AUTO: NEGATIVE
LYMPHOCYTES # BLD AUTO: 1.82 10*3/MM3 (ref 0.7–3.1)
LYMPHOCYTES NFR BLD AUTO: 12 % (ref 19.6–45.3)
MCH RBC QN AUTO: 29.2 PG (ref 26.6–33)
MCHC RBC AUTO-ENTMCNC: 34.9 G/DL (ref 31.5–35.7)
MCV RBC AUTO: 83.6 FL (ref 79–97)
METHADONE UR QL SCN: POSITIVE
MONOCYTES # BLD AUTO: 0.85 10*3/MM3 (ref 0.1–0.9)
MONOCYTES NFR BLD AUTO: 5.6 % (ref 5–12)
MUCOUS THREADS URNS QL MICRO: ABNORMAL /HPF
NEUTROPHILS NFR BLD AUTO: 12.19 10*3/MM3 (ref 1.7–7)
NEUTROPHILS NFR BLD AUTO: 80.7 % (ref 42.7–76)
NITRITE UR QL STRIP: NEGATIVE
NRBC BLD AUTO-RTO: 0 /100 WBC (ref 0–0.2)
OPIATES UR QL: NEGATIVE
OXYCODONE UR QL SCN: NEGATIVE
PCP UR QL SCN: NEGATIVE
PH UR STRIP.AUTO: 5.5 [PH] (ref 5–8)
PLATELET # BLD AUTO: 389 10*3/MM3 (ref 140–450)
PMV BLD AUTO: 9 FL (ref 6–12)
POTASSIUM SERPL-SCNC: 4.9 MMOL/L (ref 3.5–5.2)
PROT SERPL-MCNC: 8.6 G/DL (ref 6–8.5)
PROT UR QL STRIP: ABNORMAL
RBC # BLD AUTO: 5.96 10*6/MM3 (ref 4.14–5.8)
RBC # UR STRIP: ABNORMAL /HPF
REF LAB TEST METHOD: ABNORMAL
SODIUM SERPL-SCNC: 139 MMOL/L (ref 136–145)
SP GR UR STRIP: 1.02 (ref 1–1.03)
SQUAMOUS #/AREA URNS HPF: ABNORMAL /HPF
TRICYCLICS UR QL SCN: NEGATIVE
TROPONIN T DELTA: NORMAL
TROPONIN T SERPL HS-MCNC: 9 NG/L
UROBILINOGEN UR QL STRIP: ABNORMAL
WBC # UR STRIP: ABNORMAL /HPF
WBC NRBC COR # BLD AUTO: 15.11 10*3/MM3 (ref 3.4–10.8)
WHOLE BLOOD HOLD COAG: NORMAL
WHOLE BLOOD HOLD SPECIMEN: NORMAL

## 2024-07-26 PROCEDURE — 25810000003 SODIUM CHLORIDE 0.9 % SOLUTION

## 2024-07-26 PROCEDURE — 82553 CREATINE MB FRACTION: CPT

## 2024-07-26 PROCEDURE — 71045 X-RAY EXAM CHEST 1 VIEW: CPT

## 2024-07-26 PROCEDURE — 82550 ASSAY OF CK (CPK): CPT

## 2024-07-26 PROCEDURE — 85025 COMPLETE CBC W/AUTO DIFF WBC: CPT | Performed by: STUDENT IN AN ORGANIZED HEALTH CARE EDUCATION/TRAINING PROGRAM

## 2024-07-26 PROCEDURE — 74176 CT ABD & PELVIS W/O CONTRAST: CPT

## 2024-07-26 PROCEDURE — 84484 ASSAY OF TROPONIN QUANT: CPT | Performed by: STUDENT IN AN ORGANIZED HEALTH CARE EDUCATION/TRAINING PROGRAM

## 2024-07-26 PROCEDURE — 71250 CT THORAX DX C-: CPT

## 2024-07-26 PROCEDURE — 99223 1ST HOSP IP/OBS HIGH 75: CPT | Performed by: FAMILY MEDICINE

## 2024-07-26 PROCEDURE — 80053 COMPREHEN METABOLIC PANEL: CPT | Performed by: STUDENT IN AN ORGANIZED HEALTH CARE EDUCATION/TRAINING PROGRAM

## 2024-07-26 PROCEDURE — 93005 ELECTROCARDIOGRAM TRACING: CPT | Performed by: STUDENT IN AN ORGANIZED HEALTH CARE EDUCATION/TRAINING PROGRAM

## 2024-07-26 PROCEDURE — 36415 COLL VENOUS BLD VENIPUNCTURE: CPT

## 2024-07-26 PROCEDURE — 81001 URINALYSIS AUTO W/SCOPE: CPT

## 2024-07-26 PROCEDURE — 25010000002 ONDANSETRON PER 1 MG

## 2024-07-26 PROCEDURE — 99285 EMERGENCY DEPT VISIT HI MDM: CPT

## 2024-07-26 PROCEDURE — 80307 DRUG TEST PRSMV CHEM ANLYZR: CPT

## 2024-07-26 RX ORDER — SODIUM CHLORIDE 0.9 % (FLUSH) 0.9 %
10 SYRINGE (ML) INJECTION AS NEEDED
Status: DISCONTINUED | OUTPATIENT
Start: 2024-07-26 | End: 2024-07-28 | Stop reason: HOSPADM

## 2024-07-26 RX ORDER — ASPIRIN 325 MG
325 TABLET ORAL ONCE
Status: COMPLETED | OUTPATIENT
Start: 2024-07-26 | End: 2024-07-26

## 2024-07-26 RX ORDER — NICOTINE 21 MG/24HR
1 PATCH, TRANSDERMAL 24 HOURS TRANSDERMAL ONCE
Status: COMPLETED | OUTPATIENT
Start: 2024-07-26 | End: 2024-07-28

## 2024-07-26 RX ORDER — METHADONE HYDROCHLORIDE 10 MG/1
40 TABLET ORAL DAILY
COMMUNITY

## 2024-07-26 RX ORDER — LISINOPRIL 20 MG/1
20 TABLET ORAL DAILY
COMMUNITY
End: 2024-07-28 | Stop reason: HOSPADM

## 2024-07-26 RX ORDER — ONDANSETRON 2 MG/ML
4 INJECTION INTRAMUSCULAR; INTRAVENOUS ONCE
Status: COMPLETED | OUTPATIENT
Start: 2024-07-26 | End: 2024-07-26

## 2024-07-26 RX ORDER — ACYCLOVIR 800 MG/1
800 TABLET ORAL
COMMUNITY
Start: 2024-07-23 | End: 2024-07-29

## 2024-07-26 RX ADMIN — SODIUM CHLORIDE 1000 ML: 9 INJECTION, SOLUTION INTRAVENOUS at 19:35

## 2024-07-26 RX ADMIN — SODIUM CHLORIDE 1000 ML: 9 INJECTION, SOLUTION INTRAVENOUS at 17:51

## 2024-07-26 RX ADMIN — NICOTINE 1 PATCH: 21 PATCH TRANSDERMAL at 19:35

## 2024-07-26 RX ADMIN — ONDANSETRON 4 MG: 2 INJECTION INTRAMUSCULAR; INTRAVENOUS at 17:52

## 2024-07-26 RX ADMIN — ASPIRIN 325 MG: 325 TABLET ORAL at 17:52

## 2024-07-26 NOTE — ED PROVIDER NOTES
Subjective  History of Present Illness:    This is a 44-year-old presented for evaluation of chest pain.  Onset was yesterday evening.  He reports that it is come and go.  Associate with mild shortness of breath.  He does have a history of psychosis, substance abuse currently on methadone, and hepatitis C.  He denies any IV drug use within the last several years.  He reports that he has also had a lot of unsteady/dizzy feelings today.  He reports that the symptoms have been chronic and he has been experiencing these symptoms for multiple months.  He tells me he has had difficulty urinating today but no dysuria hematuria urgency or frequency.  He reports that he has not had a lot of p.o. intake, has not urinated at all today.  He denies any abdominal pain.  He reports associated nausea vomiting and decreased ability to keep foods down by mouth.  He denies any unilateral leg pain or swelling.  No hemoptysis.  Denies fevers or cough.  He denies any vertiginous symptoms and report his dizziness feels more of an unsteady feeling.      Nurses Notes reviewed and agree, including vitals, allergies, social history and prior medical history.     REVIEW OF SYSTEMS: All systems reviewed and not pertinent unless noted.  Review of Systems   Constitutional:  Negative for fever.   Respiratory:  Positive for shortness of breath. Negative for cough.    Cardiovascular:  Positive for chest pain. Negative for leg swelling.   Gastrointestinal:  Positive for nausea and vomiting. Negative for abdominal pain and diarrhea.   Genitourinary:  Positive for difficulty urinating. Negative for dysuria, flank pain, hematuria and urgency.   Neurological:  Positive for dizziness and light-headedness.   All other systems reviewed and are negative.      Past Medical History:   Diagnosis Date    Psychosis     Substance abuse        Allergies:    Penicillins      History reviewed. No pertinent surgical history.      Social History     Socioeconomic  "History    Marital status: Single   Tobacco Use    Smoking status: Every Day     Current packs/day: 1.00     Types: Cigarettes    Smokeless tobacco: Never   Vaping Use    Vaping status: Never Used   Substance and Sexual Activity    Alcohol use: Yes     Comment: rarely    Drug use: Not Currently     Types: IV, Methamphetamines     Comment: states its been 4-5 months heroin and meth    Sexual activity: Defer     Partners: Female         History reviewed. No pertinent family history.    Objective  Physical Exam:  /80   Pulse 64   Temp 97.8 °F (36.6 °C) (Oral)   Resp 20   Ht 180.3 cm (71\")   Wt 76.2 kg (168 lb)   SpO2 94%   BMI 23.43 kg/m²      Physical Exam  Vitals and nursing note reviewed.   Constitutional:       General: He is not in acute distress.     Appearance: He is well-developed and normal weight. He is not toxic-appearing or diaphoretic.   HENT:      Head: Normocephalic and atraumatic.   Eyes:      Extraocular Movements: Extraocular movements intact.      Pupils: Pupils are equal, round, and reactive to light.   Cardiovascular:      Rate and Rhythm: Normal rate and regular rhythm.      Pulses:           Radial pulses are 2+ on the right side and 2+ on the left side.      Heart sounds: Normal heart sounds.   Pulmonary:      Effort: Pulmonary effort is normal. No tachypnea.      Breath sounds: No decreased breath sounds, wheezing, rhonchi or rales.   Chest:      Chest wall: No mass, deformity, tenderness or crepitus.   Abdominal:      Palpations: Abdomen is soft.      Tenderness: There is no abdominal tenderness. There is no guarding or rebound.   Musculoskeletal:      Cervical back: Normal range of motion.      Right lower leg: No tenderness. No edema.      Left lower leg: No tenderness. No edema.   Skin:     General: Skin is warm and dry.      Capillary Refill: Capillary refill takes less than 2 seconds.   Neurological:      General: No focal deficit present.      Mental Status: He is alert and " oriented to person, place, and time.   Psychiatric:         Mood and Affect: Mood normal.         Behavior: Behavior normal.               Procedures    ED Course:    ED Course as of 07/26/24 2143 Fri Jul 26, 2024 2130 I have reviewed the mid-level provider(s) note and verbally discussed the case/plan of care.  I was available for consultation as needed at all times during the patient's visit in the Emergency Department.  I agree with the clinical impression, plan, and disposition unless otherwise stated in the MDM below.    ATTENDING ATTESTATION  HPI: 44 year old male presents with chest pain. Started gradually yesterday. Associated shortness of breath. PMHx of remote IVDU and substance abuse on methadone.    MDM: ED workup reviewed.    EKG per my interpretation NSR, rate 73, normal axis, no STEMI or T wave abnormalities.    Labs reviewed.  CBC shows leukocytosis.  CMP shows acute kidney injury with creatinine 3.0.  Total CK3 22.  Troponin normal.    Radiology reports reviewed.  CT chest abdomen pelvis without contrast unremarkable.    Rx IV fluids. Plan for medical admission. [JS]      ED Course User Index  [JS] Fox Leos DO       Lab Results (last 24 hours)       Procedure Component Value Units Date/Time    CBC & Differential [[id number]]  (Abnormal) Collected: 07/26/24 1747    Specimen: Blood Updated: 07/26/24 1756    Narrative:      The following orders were created for panel order CBC & Differential.  Procedure                               Abnormality         Status                     ---------                               -----------         ------                     CBC Auto Differential[[id number]]        Abnormal            Final result                 Please view results for these tests on the individual orders.    Comprehensive Metabolic Panel [[id number]]  (Abnormal) Collected: 07/26/24 1747    Specimen: Blood Updated: 07/26/24 1813     Glucose 127 mg/dL      BUN 24 mg/dL      Creatinine  3.07 mg/dL      Sodium 139 mmol/L      Potassium 4.9 mmol/L      Chloride 96 mmol/L      CO2 27.6 mmol/L      Calcium 10.8 mg/dL      Total Protein 8.6 g/dL      Albumin 5.1 g/dL      ALT (SGPT) 33 U/L      AST (SGOT) 38 U/L      Alkaline Phosphatase 188 U/L      Total Bilirubin 0.5 mg/dL      Globulin 3.5 gm/dL      A/G Ratio 1.5 g/dL      BUN/Creatinine Ratio 7.8     Anion Gap 15.4 mmol/L      eGFR 24.9 mL/min/1.73     Narrative:      GFR Normal >60  Chronic Kidney Disease <60  Kidney Failure <15      High Sensitivity Troponin T [[id number]]  (Normal) Collected: 07/26/24 1747    Specimen: Blood Updated: 07/26/24 1816     HS Troponin T 9 ng/L     Narrative:      High Sensitive Troponin T Reference Range:  <14.0 ng/L- Negative Female for AMI  <22.0 ng/L- Negative Male for AMI  >=14 - Abnormal Female indicating possible myocardial injury.  >=22 - Abnormal Male indicating possible myocardial injury.   Clinicians would have to utilize clinical acumen, EKG, Troponin, and serial changes to determine if it is an Acute Myocardial Infarction or myocardial injury due to an underlying chronic condition.         CBC Auto Differential [[id number]]  (Abnormal) Collected: 07/26/24 1747    Specimen: Blood Updated: 07/26/24 1756     WBC 15.11 10*3/mm3      RBC 5.96 10*6/mm3      Hemoglobin 17.4 g/dL      Hematocrit 49.8 %      MCV 83.6 fL      MCH 29.2 pg      MCHC 34.9 g/dL      RDW 12.5 %      RDW-SD 38.3 fl      MPV 9.0 fL      Platelets 389 10*3/mm3      Neutrophil % 80.7 %      Lymphocyte % 12.0 %      Monocyte % 5.6 %      Eosinophil % 0.4 %      Basophil % 0.6 %      Immature Grans % 0.7 %      Neutrophils, Absolute 12.19 10*3/mm3      Lymphocytes, Absolute 1.82 10*3/mm3      Monocytes, Absolute 0.85 10*3/mm3      Eosinophils, Absolute 0.06 10*3/mm3      Basophils, Absolute 0.09 10*3/mm3      Immature Grans, Absolute 0.10 10*3/mm3      nRBC 0.0 /100 WBC     CK Total & CKMB [[id number]]  (Abnormal) Collected: 07/26/24 7456     Specimen: Blood Updated: 07/26/24 1838     CKMB 4.54 ng/mL      Creatine Kinase 322 U/L     Narrative:      CKMB results may be falsely decreased if patient taking Biotin.    High Sensitivity Troponin T 2Hr [[id number]] Collected: 07/26/24 2108    Specimen: Blood from Cannula Updated: 07/26/24 2130     HS Troponin T <6 ng/L      Troponin T Delta --     Comment: Unable to calculate.       Narrative:      High Sensitive Troponin T Reference Range:  <14.0 ng/L- Negative Female for AMI  <22.0 ng/L- Negative Male for AMI  >=14 - Abnormal Female indicating possible myocardial injury.  >=22 - Abnormal Male indicating possible myocardial injury.   Clinicians would have to utilize clinical acumen, EKG, Troponin, and serial changes to determine if it is an Acute Myocardial Infarction or myocardial injury due to an underlying chronic condition.                  CT Abdomen Pelvis Without Contrast    Result Date: 7/26/2024  FINAL REPORT TECHNIQUE: Routine axial images through the abdomen and pelvis were obtained. This study was performed with techniques to keep radiation doses as low as reasonably achievable (ALARA). Individualized dose reduction techniques using automated exposure control or adjustment of mA and/or kV according to the patient's size were employed. CLINICAL HISTORY: difficulty urinating, creatinine of 3 FINDINGS: Abdomen:  The gallbladder is normal.  The solid abdominal organs and ureters are unremarkable.  The GI tract is unremarkable, including the appendix.  Pelvis: The urinary bladder is normal. There is no pelvic or abdominal ascites, adenopathy or acute osseous abnormality.     Impression: Unremarkable. Authenticated and Electronically Signed by Randall Peraza M.D. on 07/26/2024 08:07:32 PM    CT Chest Without Contrast Diagnostic    Result Date: 7/26/2024  FINAL REPORT TECHNIQUE: Routine axial images were obtained from the lung apices to below the diaphragm without contrast.This study was performed with  techniques to keep radiation dose as low as reasonably achievable (ALARA).  Individualized dose techniques using automated exposure controls or adjustment of mA and/or KV according to the patient size were employed. CLINICAL HISTORY: sternal chest pain, rule out pneumonia FINDINGS: The lungs are clear. There is no pleural disease, adenopathy, or significant osseous abnormality.     Impression: Unremarkable. Authenticated and Electronically Signed by Randall Peraza M.D. on 07/26/2024 08:04:00 PM        MDM     Amount and/or Complexity of Data Reviewed  Clinical lab tests: reviewed  Tests in the radiology section of CPT®: reviewed  Tests in the medicine section of CPT®: reviewed  Decide to obtain previous medical records or to obtain history from someone other than the patient: yes        Initial impression of presenting illness: This is a 44-year-old male presenting for evaluation of dizziness, chest pain.    DDX: includes but is not limited to: ACS, NSTEMI, pneumothorax, substance abuse, acute kidney injury, dehydration, orthostatic hypotension, arrhythmia, rhabdomyolysis, decreased p.o. intake, Ménière's disease, otitis media, others    Patient arrives hemodynamically stable afebrile nontachycardic not given nonhypoxic with vitals interpreted by myself.     Pertinent features from physical exam: Oropharynx with dry mucous membranes, cardiac auscultation regular and rhythm, lungs were clear.  Pupils PERRLA, cranial nerves without deficit, grossly intact.  Extraocular movements are intact.  No entrapment.  Abdomen soft nontender nonreactive.    Initial diagnostic plan: CBC CMP urinalysis urine drug screen troponin EKG CT chest Noncon and CT abdomen pelvis without contrast    Results from initial plan were reviewed and interpreted by me revealing CBC with white blood cell count of 15.11, red blood cells of 5.96.  CMP with disturbances with a creatinine of 3.07, calcium of 10.8, alkaline phosphatase 188, glucose 127, BUN of  24, anion gap of 15.4 and GFR of 24.9.  CT chest per radiology is unremarkable no pleural disease or osseous abnormality per radiology interpretation.  CT abdomen pelvis per radiology is also unremarkable.  Chest x-ray my dependent interpretation no acute process.  Heart score 1.    Given that patient denies any IV drug use within the last several years, low concern for endocarditis as cause of the patient's chest pain.  No rash on the palms or soles.    Diagnostic information from other sources: Records reviewed    Interventions / Re-evaluation: Aspirin, Zofran, 1 L fluids.  I do suspect that the patient's symptoms of dizziness are likely related to significant elevation in creatinine causing acute kidney injury and dehydration.  He was given a total of 2 L of normal saline, additionally given nicotine patch at the patient's request given that he does have a history of smoking.    Results/clinical rationale were discussed with patient at bedside, he was agreeable to admission.  Suspect patient's symptoms are likely related to significant dehydration with acute kidney injury.  Repeat troponin pending at time of admission, ACS at this time not suspected.      Consultations/Discussion of results with other physicians: Discussed case with ED attending physician.  Discussed with hospitalist for admission, Dr. Branch.  Agreeable to admit.    Disposition plan: Admit to hospital service.-----    Final diagnoses:   Acute kidney injury   Dehydration   Chest pain, unspecified type   Leukocytosis, unspecified type          Walker Thomas PA-C  07/26/24 2131       Walker Thomas PA-C  07/26/24 2143

## 2024-07-27 PROBLEM — F11.90 METHADONE USE: Status: ACTIVE | Noted: 2024-07-27

## 2024-07-27 PROBLEM — R74.8 ELEVATED CK: Status: ACTIVE | Noted: 2024-07-27

## 2024-07-27 PROBLEM — I95.9 HYPOTENSION: Status: ACTIVE | Noted: 2024-07-27

## 2024-07-27 PROBLEM — I10 ESSENTIAL HYPERTENSION: Status: ACTIVE | Noted: 2024-07-27

## 2024-07-27 PROBLEM — D72.829 LEUKOCYTOSIS: Status: ACTIVE | Noted: 2024-07-27

## 2024-07-27 PROBLEM — E86.1 HYPOVOLEMIA: Status: ACTIVE | Noted: 2024-07-27

## 2024-07-27 LAB
ANION GAP SERPL CALCULATED.3IONS-SCNC: 14.2 MMOL/L (ref 5–15)
BACTERIA UR QL AUTO: NORMAL /HPF
BILIRUB UR QL STRIP: NEGATIVE
BUN SERPL-MCNC: 23 MG/DL (ref 6–20)
BUN/CREAT SERPL: 19.7 (ref 7–25)
CALCIUM SPEC-SCNC: 8.3 MG/DL (ref 8.6–10.5)
CHLORIDE SERPL-SCNC: 104 MMOL/L (ref 98–107)
CLARITY UR: CLEAR
CO2 SERPL-SCNC: 25.8 MMOL/L (ref 22–29)
COLOR UR: YELLOW
CREAT SERPL-MCNC: 1.17 MG/DL (ref 0.76–1.27)
DEPRECATED RDW RBC AUTO: 39.2 FL (ref 37–54)
EGFRCR SERPLBLD CKD-EPI 2021: 79.3 ML/MIN/1.73
ERYTHROCYTE [DISTWIDTH] IN BLOOD BY AUTOMATED COUNT: 12.8 % (ref 12.3–15.4)
GLUCOSE SERPL-MCNC: 97 MG/DL (ref 65–99)
GLUCOSE UR STRIP-MCNC: NEGATIVE MG/DL
HCT VFR BLD AUTO: 39.4 % (ref 37.5–51)
HGB BLD-MCNC: 13.6 G/DL (ref 13–17.7)
HGB UR QL STRIP.AUTO: NEGATIVE
HYALINE CASTS UR QL AUTO: NORMAL /LPF
KETONES UR QL STRIP: NEGATIVE
LEUKOCYTE ESTERASE UR QL STRIP.AUTO: NEGATIVE
MCH RBC QN AUTO: 29.2 PG (ref 26.6–33)
MCHC RBC AUTO-ENTMCNC: 34.5 G/DL (ref 31.5–35.7)
MCV RBC AUTO: 84.7 FL (ref 79–97)
NITRITE UR QL STRIP: NEGATIVE
PH UR STRIP.AUTO: 5.5 [PH] (ref 5–8)
PLATELET # BLD AUTO: 289 10*3/MM3 (ref 140–450)
PMV BLD AUTO: 9.3 FL (ref 6–12)
POTASSIUM SERPL-SCNC: 4 MMOL/L (ref 3.5–5.2)
PROT UR QL STRIP: ABNORMAL
RBC # BLD AUTO: 4.65 10*6/MM3 (ref 4.14–5.8)
RBC # UR STRIP: NORMAL /HPF
REF LAB TEST METHOD: NORMAL
SODIUM SERPL-SCNC: 144 MMOL/L (ref 136–145)
SP GR UR STRIP: 1.03 (ref 1–1.03)
SQUAMOUS #/AREA URNS HPF: NORMAL /HPF
UROBILINOGEN UR QL STRIP: ABNORMAL
WBC # UR STRIP: NORMAL /HPF
WBC NRBC COR # BLD AUTO: 10 10*3/MM3 (ref 3.4–10.8)

## 2024-07-27 PROCEDURE — 25810000003 SODIUM CHLORIDE 0.9 % SOLUTION: Performed by: FAMILY MEDICINE

## 2024-07-27 PROCEDURE — 25010000002 HEPARIN (PORCINE) PER 1000 UNITS: Performed by: FAMILY MEDICINE

## 2024-07-27 PROCEDURE — 25010000002 ONDANSETRON PER 1 MG: Performed by: FAMILY MEDICINE

## 2024-07-27 PROCEDURE — 25810000003 LACTATED RINGERS PER 1000 ML: Performed by: INTERNAL MEDICINE

## 2024-07-27 PROCEDURE — 99232 SBSQ HOSP IP/OBS MODERATE 35: CPT | Performed by: NURSE PRACTITIONER

## 2024-07-27 PROCEDURE — 85027 COMPLETE CBC AUTOMATED: CPT | Performed by: FAMILY MEDICINE

## 2024-07-27 PROCEDURE — 80048 BASIC METABOLIC PNL TOTAL CA: CPT | Performed by: FAMILY MEDICINE

## 2024-07-27 PROCEDURE — 82570 ASSAY OF URINE CREATININE: CPT | Performed by: INTERNAL MEDICINE

## 2024-07-27 PROCEDURE — 84156 ASSAY OF PROTEIN URINE: CPT | Performed by: INTERNAL MEDICINE

## 2024-07-27 PROCEDURE — 81001 URINALYSIS AUTO W/SCOPE: CPT | Performed by: INTERNAL MEDICINE

## 2024-07-27 RX ORDER — NITROGLYCERIN 0.4 MG/1
0.4 TABLET SUBLINGUAL
Status: DISCONTINUED | OUTPATIENT
Start: 2024-07-27 | End: 2024-07-28 | Stop reason: HOSPADM

## 2024-07-27 RX ORDER — ACETAMINOPHEN 160 MG/5ML
650 SOLUTION ORAL EVERY 4 HOURS PRN
Status: DISCONTINUED | OUTPATIENT
Start: 2024-07-27 | End: 2024-07-28 | Stop reason: HOSPADM

## 2024-07-27 RX ORDER — METHADONE HYDROCHLORIDE 10 MG/1
40 TABLET ORAL DAILY
Status: DISCONTINUED | OUTPATIENT
Start: 2024-07-27 | End: 2024-07-28 | Stop reason: HOSPADM

## 2024-07-27 RX ORDER — SODIUM CHLORIDE, SODIUM LACTATE, POTASSIUM CHLORIDE, CALCIUM CHLORIDE 600; 310; 30; 20 MG/100ML; MG/100ML; MG/100ML; MG/100ML
150 INJECTION, SOLUTION INTRAVENOUS CONTINUOUS
Status: DISCONTINUED | OUTPATIENT
Start: 2024-07-27 | End: 2024-07-28 | Stop reason: HOSPADM

## 2024-07-27 RX ORDER — ONDANSETRON 2 MG/ML
4 INJECTION INTRAMUSCULAR; INTRAVENOUS EVERY 6 HOURS PRN
Status: DISCONTINUED | OUTPATIENT
Start: 2024-07-27 | End: 2024-07-28 | Stop reason: HOSPADM

## 2024-07-27 RX ORDER — SODIUM CHLORIDE 0.9 % (FLUSH) 0.9 %
10 SYRINGE (ML) INJECTION EVERY 12 HOURS SCHEDULED
Status: DISCONTINUED | OUTPATIENT
Start: 2024-07-27 | End: 2024-07-28 | Stop reason: HOSPADM

## 2024-07-27 RX ORDER — AMOXICILLIN 250 MG
2 CAPSULE ORAL 2 TIMES DAILY PRN
Status: DISCONTINUED | OUTPATIENT
Start: 2024-07-27 | End: 2024-07-28 | Stop reason: HOSPADM

## 2024-07-27 RX ORDER — SODIUM CHLORIDE 9 MG/ML
40 INJECTION, SOLUTION INTRAVENOUS AS NEEDED
Status: DISCONTINUED | OUTPATIENT
Start: 2024-07-27 | End: 2024-07-28 | Stop reason: HOSPADM

## 2024-07-27 RX ORDER — ACETAMINOPHEN 325 MG/1
650 TABLET ORAL EVERY 4 HOURS PRN
Status: DISCONTINUED | OUTPATIENT
Start: 2024-07-27 | End: 2024-07-28 | Stop reason: HOSPADM

## 2024-07-27 RX ORDER — ACETAMINOPHEN 650 MG/1
650 SUPPOSITORY RECTAL EVERY 4 HOURS PRN
Status: DISCONTINUED | OUTPATIENT
Start: 2024-07-27 | End: 2024-07-28 | Stop reason: HOSPADM

## 2024-07-27 RX ORDER — CALCIUM CARBONATE 500 MG/1
2 TABLET, CHEWABLE ORAL 3 TIMES DAILY PRN
Status: DISCONTINUED | OUTPATIENT
Start: 2024-07-27 | End: 2024-07-28 | Stop reason: HOSPADM

## 2024-07-27 RX ORDER — SODIUM CHLORIDE 0.9 % (FLUSH) 0.9 %
10 SYRINGE (ML) INJECTION AS NEEDED
Status: DISCONTINUED | OUTPATIENT
Start: 2024-07-27 | End: 2024-07-28 | Stop reason: HOSPADM

## 2024-07-27 RX ORDER — MECLIZINE HYDROCHLORIDE 25 MG/1
12.5 TABLET ORAL 3 TIMES DAILY PRN
Status: DISCONTINUED | OUTPATIENT
Start: 2024-07-27 | End: 2024-07-28 | Stop reason: HOSPADM

## 2024-07-27 RX ORDER — HEPARIN SODIUM 5000 [USP'U]/ML
5000 INJECTION, SOLUTION INTRAVENOUS; SUBCUTANEOUS EVERY 12 HOURS SCHEDULED
Status: DISCONTINUED | OUTPATIENT
Start: 2024-07-27 | End: 2024-07-28 | Stop reason: HOSPADM

## 2024-07-27 RX ORDER — BISACODYL 5 MG/1
5 TABLET, DELAYED RELEASE ORAL DAILY PRN
Status: DISCONTINUED | OUTPATIENT
Start: 2024-07-27 | End: 2024-07-28 | Stop reason: HOSPADM

## 2024-07-27 RX ORDER — POLYETHYLENE GLYCOL 3350 17 G/17G
17 POWDER, FOR SOLUTION ORAL DAILY PRN
Status: DISCONTINUED | OUTPATIENT
Start: 2024-07-27 | End: 2024-07-28 | Stop reason: HOSPADM

## 2024-07-27 RX ORDER — BISACODYL 10 MG
10 SUPPOSITORY, RECTAL RECTAL DAILY PRN
Status: DISCONTINUED | OUTPATIENT
Start: 2024-07-27 | End: 2024-07-28 | Stop reason: HOSPADM

## 2024-07-27 RX ORDER — SODIUM CHLORIDE 9 MG/ML
125 INJECTION, SOLUTION INTRAVENOUS CONTINUOUS
Status: DISCONTINUED | OUTPATIENT
Start: 2024-07-27 | End: 2024-07-27

## 2024-07-27 RX ADMIN — SODIUM CHLORIDE, POTASSIUM CHLORIDE, SODIUM LACTATE AND CALCIUM CHLORIDE 150 ML/HR: 600; 310; 30; 20 INJECTION, SOLUTION INTRAVENOUS at 13:08

## 2024-07-27 RX ADMIN — METHADONE HYDROCHLORIDE 40 MG: 10 TABLET ORAL at 08:05

## 2024-07-27 RX ADMIN — MECLIZINE HYDROCHLORIDE 12.5 MG: 25 TABLET ORAL at 13:08

## 2024-07-27 RX ADMIN — Medication 10 ML: at 20:17

## 2024-07-27 RX ADMIN — NICOTINE 1 PATCH: 21 PATCH TRANSDERMAL at 17:00

## 2024-07-27 RX ADMIN — Medication 10 ML: at 08:07

## 2024-07-27 RX ADMIN — SODIUM CHLORIDE 125 ML/HR: 9 INJECTION, SOLUTION INTRAVENOUS at 09:23

## 2024-07-27 RX ADMIN — ONDANSETRON 4 MG: 2 INJECTION INTRAMUSCULAR; INTRAVENOUS at 16:14

## 2024-07-27 RX ADMIN — HEPARIN SODIUM 5000 UNITS: 5000 INJECTION INTRAVENOUS; SUBCUTANEOUS at 08:06

## 2024-07-27 RX ADMIN — SODIUM CHLORIDE 125 ML/HR: 9 INJECTION, SOLUTION INTRAVENOUS at 00:55

## 2024-07-27 RX ADMIN — Medication 10 ML: at 00:56

## 2024-07-27 RX ADMIN — CALCIUM CARBONATE (ANTACID) CHEW TAB 500 MG 2 TABLET: 500 CHEW TAB at 09:23

## 2024-07-27 RX ADMIN — HEPARIN SODIUM 5000 UNITS: 5000 INJECTION INTRAVENOUS; SUBCUTANEOUS at 00:55

## 2024-07-27 RX ADMIN — HEPARIN SODIUM 5000 UNITS: 5000 INJECTION INTRAVENOUS; SUBCUTANEOUS at 20:16

## 2024-07-27 RX ADMIN — SODIUM CHLORIDE, POTASSIUM CHLORIDE, SODIUM LACTATE AND CALCIUM CHLORIDE 150 ML/HR: 600; 310; 30; 20 INJECTION, SOLUTION INTRAVENOUS at 19:16

## 2024-07-27 NOTE — PROGRESS NOTES
[facility] HOSPITALIST    PROGRESS NOTE    Name:  Mo Miller   Age:  44 y.o.  Sex:  male  :  1980  MRN:  [id number]   Visit Number:  [id number]  Admission Date:  2024  Date Of Service:  24  Primary Care Physician:  Provider, No Known     LOS: 1 day :    Chief Complaint:    dizziness    Subjective:  Still complains of feeling dizzy but denies spinning sensation    Hospital Course:  Patient is a 44 years old male with a past medical history of substance abuse, currently on methadone who presented to the ER with multiple complaints.  Patient reports that he has had dizziness for a long time however over the past 2 days he has noted worsening dizziness.  He also reports intractable nausea and vomiting to the point where he was not able to tolerate even water and would throw up every time he drinks.  Patient denies any headaches, fever, chills, weakness or numbness.  He had earlier reported chest pain x 1 day in the ER but currently denies any chest pain on my evaluation.  He reports decreased urine output but no dysuria or urinary symptoms otherwise.  Patient was diagnosed with shingles on his posterior neck on  and was treated with acyclovir 800 mg 5 times a day which she has been taking since then.  Patient denies shortness of breath, cough or diarrhea.     On ER evaluation, patient was found to be hypotensive with a blood pressure of 70s to 80s systolic in the ER, afebrile, on room air. His workup was significant for creatinine of 3.0 (baseline creatinine 0.9) BUN 24, CK3 122, WBC 15.1.  HS Troponin 9-<6.  Urinalysis negative for nitrates or leukocytes.  UDS positive for methadone which is prescribed chronically CT chest without contrast unremarkable.  CT abdomen pelvis without contrast unremarkable.  Patient received 2 L boluses in the ER of normal saline with improvement on his blood pressure, he also received Zofran and aspirin.  Hospitalist consulted for  "admission, further management and treatment.      Review of Systems:     All systems were reviewed and negative except as mentioned in subjective, assessment and plan.    Vital Signs:    Temp:  [97.8 °F (36.6 °C)-98.1 °F (36.7 °C)] 97.9 °F (36.6 °C)  Heart Rate:  [53-88] 53  Resp:  [11-27] 18  BP: ()/(54-80) 121/56    Intake and output:    I/O last 3 completed shifts:  In: 1935.8 [P.O.:240; I.V.:695.8; IV Piggyback:1000]  Out: -   I/O this shift:  In: -   Out: 400 [Urine:400]    Physical Examination:  Constitutional: No acute distress, awake, alert, resting in bed  HENT: NCAT, mucous membranes moist  Respiratory: Clear to auscultation bilaterally, respiratory effort normal   Cardiovascular: RRR, no murmurs, rubs, or gallops  Gastrointestinal: Positive bowel sounds, soft, nontender, nondistended  Musculoskeletal: No bilateral ankle edema  Psychiatric: Appropriate affect, cooperative  Neurologic: Oriented x 3, no focal deficits  Skin: No rashes      Laboratory results:    Results from last 7 days   Lab Units 07/27/24  0643 07/26/24  1747   SODIUM mmol/L 144 139   POTASSIUM mmol/L 4.0 4.9   CHLORIDE mmol/L 104 96*   CO2 mmol/L 25.8 27.6   BUN mg/dL 23* 24*   CREATININE mg/dL 1.17 3.07*   CALCIUM mg/dL 8.3* 10.8*   BILIRUBIN mg/dL  --  0.5   ALK PHOS U/L  --  188*   ALT (SGPT) U/L  --  33   AST (SGOT) U/L  --  38   GLUCOSE mg/dL 97 127*     Results from last 7 days   Lab Units 07/27/24  0643 07/26/24  1747   WBC 10*3/mm3 10.00 15.11*   HEMOGLOBIN g/dL 13.6 17.4   HEMATOCRIT % 39.4 49.8   PLATELETS 10*3/mm3 289 389         Results from last 7 days   Lab Units 07/26/24  2108 07/26/24  1747   CK TOTAL U/L  --  322*   HSTROP T ng/L <6 9         No results for input(s): \"PHART\", \"CVL9OOK\", \"PO2ART\", \"EZH5WVP\", \"BASEEXCESS\" in the last 8760 hours.   I have reviewed the patient's laboratory results.    Radiology results:    XR Chest 1 View    Result Date: 7/27/2024  PROCEDURE: XR CHEST 1 VW-  HISTORY: Chest Pain Triage " Protocol; N17.9-Acute kidney failure, unspecified  COMPARISON: 8/23/2017  FINDINGS:  Portable view of the chest demonstrates the lungs to be grossly clear. There is no evidence of effusion, pneumothorax or other significant pleural disease. The mediastinum is unremarkable.  The heart size is normal.      Impression: Unremarkable portable chest.    This report was signed and finalized on 7/27/2024 7:20 AM by Henry Baker MD.      CT Abdomen Pelvis Without Contrast    Result Date: 7/26/2024  FINAL REPORT TECHNIQUE: Routine axial images through the abdomen and pelvis were obtained. This study was performed with techniques to keep radiation doses as low as reasonably achievable (ALARA). Individualized dose reduction techniques using automated exposure control or adjustment of mA and/or kV according to the patient's size were employed. CLINICAL HISTORY: difficulty urinating, creatinine of 3 FINDINGS: Abdomen:  The gallbladder is normal.  The solid abdominal organs and ureters are unremarkable.  The GI tract is unremarkable, including the appendix.  Pelvis: The urinary bladder is normal. There is no pelvic or abdominal ascites, adenopathy or acute osseous abnormality.     Impression: Unremarkable. Authenticated and Electronically Signed by Randall Peraza M.D. on 07/26/2024 08:07:32 PM    CT Chest Without Contrast Diagnostic    Result Date: 7/26/2024  FINAL REPORT TECHNIQUE: Routine axial images were obtained from the lung apices to below the diaphragm without contrast.This study was performed with techniques to keep radiation dose as low as reasonably achievable (ALARA).  Individualized dose techniques using automated exposure controls or adjustment of mA and/or KV according to the patient size were employed. CLINICAL HISTORY: sternal chest pain, rule out pneumonia FINDINGS: The lungs are clear. There is no pleural disease, adenopathy, or significant osseous abnormality.     Impression: Unremarkable. Authenticated and  Electronically Signed by Randall Peraza M.D. on 07/26/2024 08:04:00 PM   I have reviewed the patient's radiology reports.    Medication Review:     I have reviewed the patient's active and prn medications.     Problem List:      ZEYAD (acute kidney injury)    Hypotension    Hypovolemia    Methadone use    Leukocytosis    Elevated CK    Essential hypertension      Assessment/Plan:    ZEYAD (acute kidney injury)  Hypotension  Hypovolemia  Elevated CK  -- due to N/V which has resolved, continue IVF, CT A/P unremarkable   -- ZEYAD resolved  -- nephrology has seen, appreciate recs   -- avoid nephrotoxic agents    Dizziness  -- likely due to hypovolemia, not orthostatic, trial meclizine     Methadone use  -- continue    Leukocytosis  -- resolved, due to vomiting     Essential hypertension  -- holding home meds due to hypotension     DVT Prophylaxis: Heparin  Code Status: Full  Diet: regular  Discharge Plan: Home in [place], APRN  07/27/24  12:24 EDT    Dictated utilizing Dragon dictation.

## 2024-07-27 NOTE — PAYOR COMM NOTE
"Luz Maria Miller (44 y.o. Male)       Date of Birth   1980    Social Security Number       Address   [address] [place] [id number]    Home Phone   [phone]    MRN   [id number]       Faith   None    Marital Status   Single                            Admission Date   24    Admission Type   Emergency    Admitting Provider   Richi Branch MD    Attending Provider   Richi Branch MD    Department, Room/Bed   [facility] TELEMETRY /       Discharge Date       Discharge Disposition       Discharge Destination                                 Attending Provider: Richi Branch MD    Allergies: Penicillins    Isolation: None   Infection: Hepatitis C (17)   Code Status: CPR    Ht: 180 cm (70.87\")   Wt: 75.6 kg (166 lb 10.7 oz)    Admission Cmt: None   Principal Problem: ZEYAD (acute kidney injury) [N17.9]                   Active Insurance as of 2024       Primary Coverage       Payor Plan Insurance Group Employer/Plan Group    [facility] BY MARCELLUS [facility] BY MARCELLUS [id number]       Payor Plan Address Payor Plan Phone Number Payor Plan Fax Number Effective Dates    [address] [address]   2022 - None Entered    [facility] [id number]         Subscriber Name Subscriber Birth Date Member ID       LUZ MARIA MILLER 1980 [id number]                     Emergency Contacts        (Rel.) Home Phone Work Phone Mobile Phone    Alesha Butt (Sister) [phone] -- --        Icd 10 code:  N17.9      Provdier iid [id number]      Dr Shah NPI:  [id number])          History & Physical        Richi Branch MD at 24 2110            [facility]   HISTORY AND PHYSICAL      Name:  Luz Maria Miller   Age:  44 y.o.  Sex:  male  :  1980  MRN:  [id number]   Visit Number:  [id number]  Admission Date:  2024  Date Of Service:  24  Primary Care Physician:  Provider, No Known    Chief " Complaint:     Dizziness, nausea, vomiting    History Of Presenting Illness:      Patient is a 44 years old male with a past medical history of substance abuse, currently on methadone who presented to the ER with multiple complaints.  Patient reports that he has had dizziness for a long time however over the past 2 days he has noted worsening dizziness.  He also reports intractable nausea and vomiting to the point where he was not able to tolerate even water and would throw up every time he drinks.  Patient denies any headaches, fever, chills, weakness or numbness.  He had earlier reported chest pain x 1 day in the ER but currently denies any chest pain on my evaluation.  He reports decreased urine output but no dysuria or urinary symptoms otherwise.  Patient was diagnosed with shingles on his posterior neck on 7/22 and was treated with acyclovir 800 mg 5 times a day which she has been taking since then.  Patient denies shortness of breath, cough or diarrhea.    On ER evaluation, patient was found to be hypotensive with a blood pressure of 70s to 80s systolic in the ER, afebrile, on room air. His workup was significant for creatinine of 3.0 (baseline creatinine 0.9) BUN 24, CK3 122, WBC 15.1.  HS Troponin 9-<6.  Urinalysis negative for nitrates or leukocytes.  UDS positive for methadone which is prescribed chronically CT chest without contrast unremarkable.  CT abdomen pelvis without contrast unremarkable.  Patient received 2 L boluses in the ER of normal saline with improvement on his blood pressure, he also received Zofran and aspirin.  Hospitalist consulted for admission, further management and treatment.    Review Of Systems:    All systems were reviewed and negative except as mentioned in history of presenting illness, assessment and plan.    Past Medical History: Patient  has a past medical history of Psychosis and Substance abuse.    Past Surgical History: Patient  has no past surgical history on  "file.    Social History: Patient  reports that he has been smoking cigarettes. He has never used smokeless tobacco. He reports current alcohol use. He reports that he does not currently use drugs after having used the following drugs: IV and Methamphetamines.    Family History:  Patient's family history has been reviewed and found to be noncontributory.     Allergies:      Penicillins    Home Medications:    Prior to Admission Medications       None          ED Medications:    Medications   sodium chloride 0.9 % flush 10 mL (has no administration in time range)   nicotine (NICODERM CQ) 21 MG/24HR patch 1 patch (1 patch Transdermal Medication Applied 7/26/24 1935)   sodium chloride 0.9 % bolus 1,000 mL (0 mL Intravenous Stopped 7/26/24 1931)   ondansetron (ZOFRAN) injection 4 mg (4 mg Intravenous Given 7/26/24 1752)   aspirin tablet 325 mg (325 mg Oral Given 7/26/24 1752)   sodium chloride 0.9 % bolus 1,000 mL (0 mL Intravenous Stopped 7/26/24 2108)     Vital Signs:  Temp:  [97.8 °F (36.6 °C)-98.1 °F (36.7 °C)] 97.8 °F (36.6 °C)  Heart Rate:  [57-84] 64  Resp:  [11-27] 20  BP: ()/(54-80) 100/80        07/26/24  1733   Weight: 76.2 kg (168 lb)     Body mass index is 23.43 kg/m².    Physical Exam:     Most recent vital Signs: /80   Pulse 64   Temp 97.8 °F (36.6 °C) (Oral)   Resp 20   Ht 180.3 cm (71\")   Wt 76.2 kg (168 lb)   SpO2 94%   BMI 23.43 kg/m²     Physical Exam  Vitals and nursing note reviewed.   Constitutional:       General: He is not in acute distress.     Appearance: He is ill-appearing.   HENT:      Head: Normocephalic and atraumatic.      Nose: Nose normal.      Mouth/Throat:      Mouth: Mucous membranes are dry.   Eyes:      Extraocular Movements: Extraocular movements intact.      Conjunctiva/sclera: Conjunctivae normal.      Pupils: Pupils are equal, round, and reactive to light.   Cardiovascular:      Rate and Rhythm: Normal rate and regular rhythm.      Pulses: Normal pulses.      " "Heart sounds: Normal heart sounds.   Pulmonary:      Effort: Pulmonary effort is normal.      Breath sounds: Normal breath sounds. No wheezing or rhonchi.   Abdominal:      General: Bowel sounds are normal. There is no distension.      Palpations: Abdomen is soft.      Tenderness: There is no abdominal tenderness.   Musculoskeletal:         General: Normal range of motion.      Cervical back: Normal range of motion and neck supple.      Right lower leg: No edema.      Left lower leg: No edema.   Skin:     General: Skin is warm and dry.      Findings: Rash present.             Comments: Dried crusted vesicular rash on the on the right side posterior neck, nontender, no drainage or signs of infection.   Neurological:      General: No focal deficit present.      Mental Status: He is alert and oriented to person, place, and time. Mental status is at baseline.      Cranial Nerves: No cranial nerve deficit.      Sensory: No sensory deficit.      Motor: No weakness.   Psychiatric:         Mood and Affect: Mood normal.         Behavior: Behavior normal.         Thought Content: Thought content normal.         Laboratory data:    I have reviewed the labs done in the emergency room.    Results from last 7 days   Lab Units 07/26/24  1747   SODIUM mmol/L 139   POTASSIUM mmol/L 4.9   CHLORIDE mmol/L 96*   CO2 mmol/L 27.6   BUN mg/dL 24*   CREATININE mg/dL 3.07*   CALCIUM mg/dL 10.8*   BILIRUBIN mg/dL 0.5   ALK PHOS U/L 188*   ALT (SGPT) U/L 33   AST (SGOT) U/L 38   GLUCOSE mg/dL 127*     Results from last 7 days   Lab Units 07/26/24  1747   WBC 10*3/mm3 15.11*   HEMOGLOBIN g/dL 17.4   HEMATOCRIT % 49.8   PLATELETS 10*3/mm3 389         Results from last 7 days   Lab Units 07/26/24  1747   CK TOTAL U/L 322*   HSTROP T ng/L 9                           Invalid input(s): \"USDES\", \"NITRITITE\", \"BACT\", \"EP\"    Pain Management Panel  More data exists         Latest Ref Rng & Units 4/24/2023 2/20/2023   Pain Management Panel "   Amphetamine, Urine Qual Negative Negative  Negative    Barbiturates Screen, Urine Negative Negative  Negative    Benzodiazepine Screen, Urine Negative Negative  Negative    Buprenorphine, Screen, Urine Negative Negative  Negative    Cocaine Screen, Urine Negative Negative  Negative    Methadone Screen , Urine Negative Negative  Negative    Methamphetamine, Ur Negative Negative  Negative       Details                   EKG:      Sinus rhythm, heart rate 73, nonspecific ST/T wave changes.    Radiology:    CT Abdomen Pelvis Without Contrast    Result Date: 7/26/2024  FINAL REPORT TECHNIQUE: Routine axial images through the abdomen and pelvis were obtained. This study was performed with techniques to keep radiation doses as low as reasonably achievable (ALARA). Individualized dose reduction techniques using automated exposure control or adjustment of mA and/or kV according to the patient's size were employed. CLINICAL HISTORY: difficulty urinating, creatinine of 3 FINDINGS: Abdomen:  The gallbladder is normal.  The solid abdominal organs and ureters are unremarkable.  The GI tract is unremarkable, including the appendix.  Pelvis: The urinary bladder is normal. There is no pelvic or abdominal ascites, adenopathy or acute osseous abnormality.     Unremarkable. Authenticated and Electronically Signed by Randall Peraza M.D. on 07/26/2024 08:07:32 PM    CT Chest Without Contrast Diagnostic    Result Date: 7/26/2024  FINAL REPORT TECHNIQUE: Routine axial images were obtained from the lung apices to below the diaphragm without contrast.This study was performed with techniques to keep radiation dose as low as reasonably achievable (ALARA).  Individualized dose techniques using automated exposure controls or adjustment of mA and/or KV according to the patient size were employed. CLINICAL HISTORY: sternal chest pain, rule out pneumonia FINDINGS: The lungs are clear. There is no pleural disease, adenopathy, or significant osseous  abnormality.     Unremarkable. Authenticated and Electronically Signed by Randall Peraza M.D. on 07/26/2024 08:04:00 PM     Assessment:    Acute kidney injury, POA  Hypotension, unspecified, POA  Shingles, posterior neck  Hypertension  History of substance abuse  Chronic methadone  hepatitis C    Plan:    Patient is admitted for further management and treatment.    Acute kidney injury  -Multiple contributing factors including recent dehydration/nausea/vomiting.  Also acyclovir nephrotoxicity versus hypotension.  -Avoid nephrotoxic drugs  -Discontinue acyclovir  -Hold lisinopril  -Continue IV fluids  -Nephrology consulted, appreciate recommendations.    Hypotension, unspecified  -Patient reports that he took his lisinopril twice today and thought that would help on his dizziness.  -Received IV fluid boluses in the ER with improvement on his blood pressure  -Continue IV fluids  -Hold lisinopril  -No signs of infection    Posterior neck singles  -Appears to be crusted and dried vesicles now  -Will discontinue acyclovir  -Denies any pain, no secondary infection    -Continue home meds as warranted.  -Further orders as indicated per clinical course.    Risk Assessment: High  DVT Prophylaxis: Heparin prophylaxis (benefit> risk)  Code Status: Full  Diet: Renal    Advance Care Planning  ACP discussion was held with the patient during this visit. Patient does not have an advance directive, information provided.       Richi Branch MD  07/26/24  21:10 EDT    Dictated utilizing Dragon dictation.    Electronically signed by Richi Branch MD at 07/27/24 0101          Emergency Department Notes        Florina Robledo RN at 07/26/24 2139          Report given to unit nurse at this time. Pt updated on admission status      Electronically signed by Florina Robledo RN at 07/26/24 2140       Walker Thomas PA-C at 07/26/24 1746          Subjective  History of Present Illness:    This is a 44-year-old presented for evaluation of  chest pain.  Onset was yesterday evening.  He reports that it is come and go.  Associate with mild shortness of breath.  He does have a history of psychosis, substance abuse currently on methadone, and hepatitis C.  He denies any IV drug use within the last several years.  He reports that he has also had a lot of unsteady/dizzy feelings today.  He reports that the symptoms have been chronic and he has been experiencing these symptoms for multiple months.  He tells me he has had difficulty urinating today but no dysuria hematuria urgency or frequency.  He reports that he has not had a lot of p.o. intake, has not urinated at all today.  He denies any abdominal pain.  He reports associated nausea vomiting and decreased ability to keep foods down by mouth.  He denies any unilateral leg pain or swelling.  No hemoptysis.  Denies fevers or cough.  He denies any vertiginous symptoms and report his dizziness feels more of an unsteady feeling.      Nurses Notes reviewed and agree, including vitals, allergies, social history and prior medical history.     REVIEW OF SYSTEMS: All systems reviewed and not pertinent unless noted.  Review of Systems   Constitutional:  Negative for fever.   Respiratory:  Positive for shortness of breath. Negative for cough.    Cardiovascular:  Positive for chest pain. Negative for leg swelling.   Gastrointestinal:  Positive for nausea and vomiting. Negative for abdominal pain and diarrhea.   Genitourinary:  Positive for difficulty urinating. Negative for dysuria, flank pain, hematuria and urgency.   Neurological:  Positive for dizziness and light-headedness.   All other systems reviewed and are negative.      Past Medical History:   Diagnosis Date    Psychosis     Substance abuse        Allergies:    Penicillins      History reviewed. No pertinent surgical history.      Social History     Socioeconomic History    Marital status: Single   Tobacco Use    Smoking status: Every Day     Current  "packs/day: 1.00     Types: Cigarettes    Smokeless tobacco: Never   Vaping Use    Vaping status: Never Used   Substance and Sexual Activity    Alcohol use: Yes     Comment: rarely    Drug use: Not Currently     Types: IV, Methamphetamines     Comment: states its been 4-5 months heroin and meth    Sexual activity: Defer     Partners: Female         History reviewed. No pertinent family history.    Objective  Physical Exam:  /80   Pulse 64   Temp 97.8 °F (36.6 °C) (Oral)   Resp 20   Ht 180.3 cm (71\")   Wt 76.2 kg (168 lb)   SpO2 94%   BMI 23.43 kg/m²      Physical Exam  Vitals and nursing note reviewed.   Constitutional:       General: He is not in acute distress.     Appearance: He is well-developed and normal weight. He is not toxic-appearing or diaphoretic.   HENT:      Head: Normocephalic and atraumatic.   Eyes:      Extraocular Movements: Extraocular movements intact.      Pupils: Pupils are equal, round, and reactive to light.   Cardiovascular:      Rate and Rhythm: Normal rate and regular rhythm.      Pulses:           Radial pulses are 2+ on the right side and 2+ on the left side.      Heart sounds: Normal heart sounds.   Pulmonary:      Effort: Pulmonary effort is normal. No tachypnea.      Breath sounds: No decreased breath sounds, wheezing, rhonchi or rales.   Chest:      Chest wall: No mass, deformity, tenderness or crepitus.   Abdominal:      Palpations: Abdomen is soft.      Tenderness: There is no abdominal tenderness. There is no guarding or rebound.   Musculoskeletal:      Cervical back: Normal range of motion.      Right lower leg: No tenderness. No edema.      Left lower leg: No tenderness. No edema.   Skin:     General: Skin is warm and dry.      Capillary Refill: Capillary refill takes less than 2 seconds.   Neurological:      General: No focal deficit present.      Mental Status: He is alert and oriented to person, place, and time.   Psychiatric:         Mood and Affect: Mood normal. "         Behavior: Behavior normal.               Procedures    ED Course:    ED Course as of 07/26/24 2143 Fri Jul 26, 2024 2130 I have reviewed the mid-level provider(s) note and verbally discussed the case/plan of care.  I was available for consultation as needed at all times during the patient's visit in the Emergency Department.  I agree with the clinical impression, plan, and disposition unless otherwise stated in the MDM below.    ATTENDING ATTESTATION  HPI: 44 year old male presents with chest pain. Started gradually yesterday. Associated shortness of breath. PMHx of remote IVDU and substance abuse on methadone.    MDM: ED workup reviewed.    EKG per my interpretation NSR, rate 73, normal axis, no STEMI or T wave abnormalities.    Labs reviewed.  CBC shows leukocytosis.  CMP shows acute kidney injury with creatinine 3.0.  Total CK3 22.  Troponin normal.    Radiology reports reviewed.  CT chest abdomen pelvis without contrast unremarkable.    Rx IV fluids. Plan for medical admission. [JS]      ED Course User Index  [JS] Fox Leos DO       Lab Results (last 24 hours)       Procedure Component Value Units Date/Time    CBC & Differential [[id number]]  (Abnormal) Collected: 07/26/24 1747    Specimen: Blood Updated: 07/26/24 1756    Narrative:      The following orders were created for panel order CBC & Differential.  Procedure                               Abnormality         Status                     ---------                               -----------         ------                     CBC Auto Differential[[id number]]        Abnormal            Final result                 Please view results for these tests on the individual orders.    Comprehensive Metabolic Panel [[id number]]  (Abnormal) Collected: 07/26/24 1747    Specimen: Blood Updated: 07/26/24 1813     Glucose 127 mg/dL      BUN 24 mg/dL      Creatinine 3.07 mg/dL      Sodium 139 mmol/L      Potassium 4.9 mmol/L      Chloride 96 mmol/L      CO2  27.6 mmol/L      Calcium 10.8 mg/dL      Total Protein 8.6 g/dL      Albumin 5.1 g/dL      ALT (SGPT) 33 U/L      AST (SGOT) 38 U/L      Alkaline Phosphatase 188 U/L      Total Bilirubin 0.5 mg/dL      Globulin 3.5 gm/dL      A/G Ratio 1.5 g/dL      BUN/Creatinine Ratio 7.8     Anion Gap 15.4 mmol/L      eGFR 24.9 mL/min/1.73     Narrative:      GFR Normal >60  Chronic Kidney Disease <60  Kidney Failure <15      High Sensitivity Troponin T [[id number]]  (Normal) Collected: 07/26/24 1747    Specimen: Blood Updated: 07/26/24 1816     HS Troponin T 9 ng/L     Narrative:      High Sensitive Troponin T Reference Range:  <14.0 ng/L- Negative Female for AMI  <22.0 ng/L- Negative Male for AMI  >=14 - Abnormal Female indicating possible myocardial injury.  >=22 - Abnormal Male indicating possible myocardial injury.   Clinicians would have to utilize clinical acumen, EKG, Troponin, and serial changes to determine if it is an Acute Myocardial Infarction or myocardial injury due to an underlying chronic condition.         CBC Auto Differential [[id number]]  (Abnormal) Collected: 07/26/24 1747    Specimen: Blood Updated: 07/26/24 1756     WBC 15.11 10*3/mm3      RBC 5.96 10*6/mm3      Hemoglobin 17.4 g/dL      Hematocrit 49.8 %      MCV 83.6 fL      MCH 29.2 pg      MCHC 34.9 g/dL      RDW 12.5 %      RDW-SD 38.3 fl      MPV 9.0 fL      Platelets 389 10*3/mm3      Neutrophil % 80.7 %      Lymphocyte % 12.0 %      Monocyte % 5.6 %      Eosinophil % 0.4 %      Basophil % 0.6 %      Immature Grans % 0.7 %      Neutrophils, Absolute 12.19 10*3/mm3      Lymphocytes, Absolute 1.82 10*3/mm3      Monocytes, Absolute 0.85 10*3/mm3      Eosinophils, Absolute 0.06 10*3/mm3      Basophils, Absolute 0.09 10*3/mm3      Immature Grans, Absolute 0.10 10*3/mm3      nRBC 0.0 /100 WBC     CK Total & CKMB [[id number]]  (Abnormal) Collected: 07/26/24 1747    Specimen: Blood Updated: 07/26/24 1838     CKMB 4.54 ng/mL      Creatine Kinase 322 U/L      Narrative:      CKMB results may be falsely decreased if patient taking Biotin.    High Sensitivity Troponin T 2Hr [[id number]] Collected: 07/26/24 2108    Specimen: Blood from Cannula Updated: 07/26/24 2130     HS Troponin T <6 ng/L      Troponin T Delta --     Comment: Unable to calculate.       Narrative:      High Sensitive Troponin T Reference Range:  <14.0 ng/L- Negative Female for AMI  <22.0 ng/L- Negative Male for AMI  >=14 - Abnormal Female indicating possible myocardial injury.  >=22 - Abnormal Male indicating possible myocardial injury.   Clinicians would have to utilize clinical acumen, EKG, Troponin, and serial changes to determine if it is an Acute Myocardial Infarction or myocardial injury due to an underlying chronic condition.                  CT Abdomen Pelvis Without Contrast    Result Date: 7/26/2024  FINAL REPORT TECHNIQUE: Routine axial images through the abdomen and pelvis were obtained. This study was performed with techniques to keep radiation doses as low as reasonably achievable (ALARA). Individualized dose reduction techniques using automated exposure control or adjustment of mA and/or kV according to the patient's size were employed. CLINICAL HISTORY: difficulty urinating, creatinine of 3 FINDINGS: Abdomen:  The gallbladder is normal.  The solid abdominal organs and ureters are unremarkable.  The GI tract is unremarkable, including the appendix.  Pelvis: The urinary bladder is normal. There is no pelvic or abdominal ascites, adenopathy or acute osseous abnormality.     Impression: Unremarkable. Authenticated and Electronically Signed by Randall Peraza M.D. on 07/26/2024 08:07:32 PM    CT Chest Without Contrast Diagnostic    Result Date: 7/26/2024  FINAL REPORT TECHNIQUE: Routine axial images were obtained from the lung apices to below the diaphragm without contrast.This study was performed with techniques to keep radiation dose as low as reasonably achievable (ALARA).  Individualized dose  techniques using automated exposure controls or adjustment of mA and/or KV according to the patient size were employed. CLINICAL HISTORY: sternal chest pain, rule out pneumonia FINDINGS: The lungs are clear. There is no pleural disease, adenopathy, or significant osseous abnormality.     Impression: Unremarkable. Authenticated and Electronically Signed by Randall Peraza M.D. on 07/26/2024 08:04:00 PM        MDM     Amount and/or Complexity of Data Reviewed  Clinical lab tests: reviewed  Tests in the radiology section of CPT®: reviewed  Tests in the medicine section of CPT®: reviewed  Decide to obtain previous medical records or to obtain history from someone other than the patient: yes        Initial impression of presenting illness: This is a 44-year-old male presenting for evaluation of dizziness, chest pain.    DDX: includes but is not limited to: ACS, NSTEMI, pneumothorax, substance abuse, acute kidney injury, dehydration, orthostatic hypotension, arrhythmia, rhabdomyolysis, decreased p.o. intake, Ménière's disease, otitis media, others    Patient arrives hemodynamically stable afebrile nontachycardic not given nonhypoxic with vitals interpreted by myself.     Pertinent features from physical exam: Oropharynx with dry mucous membranes, cardiac auscultation regular and rhythm, lungs were clear.  Pupils PERRLA, cranial nerves without deficit, grossly intact.  Extraocular movements are intact.  No entrapment.  Abdomen soft nontender nonreactive.    Initial diagnostic plan: CBC CMP urinalysis urine drug screen troponin EKG CT chest Noncon and CT abdomen pelvis without contrast    Results from initial plan were reviewed and interpreted by me revealing CBC with white blood cell count of 15.11, red blood cells of 5.96.  CMP with disturbances with a creatinine of 3.07, calcium of 10.8, alkaline phosphatase 188, glucose 127, BUN of 24, anion gap of 15.4 and GFR of 24.9.  CT chest per radiology is unremarkable no pleural  disease or osseous abnormality per radiology interpretation.  CT abdomen pelvis per radiology is also unremarkable.  Chest x-ray my dependent interpretation no acute process.  Heart score 1.    Given that patient denies any IV drug use within the last several years, low concern for endocarditis as cause of the patient's chest pain.  No rash on the palms or soles.    Diagnostic information from other sources: Records reviewed    Interventions / Re-evaluation: Aspirin, Zofran, 1 L fluids.  I do suspect that the patient's symptoms of dizziness are likely related to significant elevation in creatinine causing acute kidney injury and dehydration.  He was given a total of 2 L of normal saline, additionally given nicotine patch at the patient's request given that he does have a history of smoking.    Results/clinical rationale were discussed with patient at bedside, he was agreeable to admission.  Suspect patient's symptoms are likely related to significant dehydration with acute kidney injury.  Repeat troponin pending at time of admission, ACS at this time not suspected.      Consultations/Discussion of results with other physicians: Discussed case with ED attending physician.  Discussed with hospitalist for admission, Dr. Branch.  Agreeable to admit.    Disposition plan: Admit to hospital service.-----    Final diagnoses:   Acute kidney injury   Dehydration   Chest pain, unspecified type   Leukocytosis, unspecified type          Walker Thomas PA-C  07/26/24 2131       Walker Thomas PA-C  07/26/24 2143      Electronically signed by Walker Thomas PA-C at 07/26/24 2143       Lab Results (last 24 hours)       Procedure Component Value Units Date/Time    Basic Metabolic Panel [[id number]]  (Abnormal) Collected: 07/27/24 0643    Specimen: Blood Updated: 07/27/24 0724     Glucose 97 mg/dL      BUN 23 mg/dL      Creatinine 1.17 mg/dL      Sodium 144 mmol/L      Potassium 4.0 mmol/L      Chloride 104 mmol/L      CO2  25.8 mmol/L      Calcium 8.3 mg/dL      BUN/Creatinine Ratio 19.7     Anion Gap 14.2 mmol/L      eGFR 79.3 mL/min/1.73     Narrative:      GFR Normal >60  Chronic Kidney Disease <60  Kidney Failure <15      CBC (No Diff) [[id number]]  (Normal) Collected: 07/27/24 0643    Specimen: Blood Updated: 07/27/24 0708     WBC 10.00 10*3/mm3      RBC 4.65 10*6/mm3      Hemoglobin 13.6 g/dL      Hematocrit 39.4 %      MCV 84.7 fL      MCH 29.2 pg      MCHC 34.5 g/dL      RDW 12.8 %      RDW-SD 39.2 fl      MPV 9.3 fL      Platelets 289 10*3/mm3     Fentanyl, Urine - Urine, Clean Catch [[id number]]  (Normal) Collected: 07/26/24 2134    Specimen: Urine, Clean Catch Updated: 07/26/24 2226     Fentanyl, Urine Negative    Narrative:      Negative Threshold:      Fentanyl 5 ng/mL     The normal value for the drug tested is negative. This report includes final unconfirmed screening results to be used for medical treatment purposes only. Unconfirmed results must not be used for non-medical purposes such as employment or legal testing. Clinical consideration should be applied to any drug of abuse test, particularly when unconfirmed results are used.           Urine Drug Screen - Urine, Clean Catch [[id number]]  (Abnormal) Collected: 07/26/24 2134    Specimen: Urine, Clean Catch Updated: 07/26/24 2224     THC, Screen, Urine Negative     Phencyclidine (PCP), Urine Negative     Cocaine Screen, Urine Negative     Methamphetamine, Ur Negative     Opiate Screen Negative     Amphetamine Screen, Urine Negative     Benzodiazepine Screen, Urine Negative     Tricyclic Antidepressants Screen Negative     Methadone Screen, Urine Positive     Barbiturates Screen, Urine Negative     Oxycodone Screen, Urine Negative     Buprenorphine, Screen, Urine Negative    Narrative:      Cutoff For Drugs Screened:    Amphetamines               500 ng/ml  Barbiturates               200 ng/ml  Benzodiazepines            150 ng/ml  Cocaine                    150  ng/ml  Methadone                  200 ng/ml  Opiates                    100 ng/ml  Phencyclidine               25 ng/ml  THC                         50 ng/ml  Methamphetamine            500 ng/ml  Tricyclic Antidepressants  300 ng/ml  Oxycodone                  100 ng/ml  Buprenorphine               10 ng/ml    The normal value for all drugs tested is negative. This report includes unconfirmed screening results, with the cutoff values listed, to be used for medical treatment purposes only.  Unconfirmed results must not be used for non-medical purposes such as employment or legal testing.  Clinical consideration should be applied to any drug of abuse test, particularly when unconfirmed results are used.      Urinalysis, Microscopic Only - Urine, Clean Catch [[id number]]  (Abnormal) Collected: 07/26/24 2134    Specimen: Urine, Clean Catch Updated: 07/26/24 2221     RBC, UA None Seen /HPF      WBC, UA 0-2 /HPF      Comment: Urine culture not indicated.        Bacteria, UA 2+ /HPF      Squamous Epithelial Cells, UA 3-6 /HPF      Hyaline Casts, UA 13-20 /LPF      Granular Casts, UA 3-6 /LPF      Mucus, UA Large/3+ /HPF      Methodology Manual Light Microscopy    Urinalysis With Culture If Indicated - Urine, Clean Catch [[id number]]  (Abnormal) Collected: 07/26/24 2134    Specimen: Urine, Clean Catch Updated: 07/26/24 2215     Color, UA Yellow     Appearance, UA Cloudy     pH, UA 5.5     Specific Gravity, UA 1.020     Glucose, UA Negative     Ketones, UA Trace     Bilirubin, UA Negative     Blood, UA Moderate (2+)     Protein,  mg/dL (2+)     Leuk Esterase, UA Negative     Nitrite, UA Negative     Urobilinogen, UA 1.0 E.U./dL    Narrative:      In absence of clinical symptoms, the presence of pyuria, bacteria, and/or nitrites on the urinalysis result does not correlate with infection.    High Sensitivity Troponin T 2Hr [[id number]] Collected: 07/26/24 2108    Specimen: Blood from Cannula Updated: 07/26/24 2130     HS  Troponin T <6 ng/L      Troponin T Delta --     Comment: Unable to calculate.       Narrative:      High Sensitive Troponin T Reference Range:  <14.0 ng/L- Negative Female for AMI  <22.0 ng/L- Negative Male for AMI  >=14 - Abnormal Female indicating possible myocardial injury.  >=22 - Abnormal Male indicating possible myocardial injury.   Clinicians would have to utilize clinical acumen, EKG, Troponin, and serial changes to determine if it is an Acute Myocardial Infarction or myocardial injury due to an underlying chronic condition.         CK Total & CKMB [[id number]]  (Abnormal) Collected: 07/26/24 1747    Specimen: Blood Updated: 07/26/24 1838     CKMB 4.54 ng/mL      Creatine Kinase 322 U/L     Narrative:      CKMB results may be falsely decreased if patient taking Biotin.    High Sensitivity Troponin T [[id number]]  (Normal) Collected: 07/26/24 1747    Specimen: Blood Updated: 07/26/24 1816     HS Troponin T 9 ng/L     Narrative:      High Sensitive Troponin T Reference Range:  <14.0 ng/L- Negative Female for AMI  <22.0 ng/L- Negative Male for AMI  >=14 - Abnormal Female indicating possible myocardial injury.  >=22 - Abnormal Male indicating possible myocardial injury.   Clinicians would have to utilize clinical acumen, EKG, Troponin, and serial changes to determine if it is an Acute Myocardial Infarction or myocardial injury due to an underlying chronic condition.         Comprehensive Metabolic Panel [[id number]]  (Abnormal) Collected: 07/26/24 1747    Specimen: Blood Updated: 07/26/24 1813     Glucose 127 mg/dL      BUN 24 mg/dL      Creatinine 3.07 mg/dL      Sodium 139 mmol/L      Potassium 4.9 mmol/L      Chloride 96 mmol/L      CO2 27.6 mmol/L      Calcium 10.8 mg/dL      Total Protein 8.6 g/dL      Albumin 5.1 g/dL      ALT (SGPT) 33 U/L      AST (SGOT) 38 U/L      Alkaline Phosphatase 188 U/L      Total Bilirubin 0.5 mg/dL      Globulin 3.5 gm/dL      A/G Ratio 1.5 g/dL      BUN/Creatinine Ratio 7.8      Anion Gap 15.4 mmol/L      eGFR 24.9 mL/min/1.73     Narrative:      GFR Normal >60  Chronic Kidney Disease <60  Kidney Failure <15      [facility]ona Draw [[id number]] Collected: 07/26/24 1747    Specimen: Blood Updated: 07/26/24 1800    Narrative:      The following orders were created for panel order [facility] Draw.  Procedure                               Abnormality         Status                     ---------                               -----------         ------                     Green Top (Gel)[[id number]]                                  Final result               Lavender Top[[id number]]                                     Final result               Gold Top - SST[[id number]]                                   Final result               Light Blue Top[[id number]]                                   Final result                 Please view results for these tests on the individual orders.    Green Top (Gel) [[id number]] Collected: 07/26/24 1747    Specimen: Blood Updated: 07/26/24 1800     Extra Tube Hold for add-ons.     Comment: Auto resulted.       Lavender Top [[id number]] Collected: 07/26/24 1747    Specimen: Blood Updated: 07/26/24 1800     Extra Tube hold for add-on     Comment: Auto resulted       Gold Top - SST [[id number]] Collected: 07/26/24 1747    Specimen: Blood Updated: 07/26/24 1800     Extra Tube Hold for add-ons.     Comment: Auto resulted.       Light Blue Top [[id number]] Collected: 07/26/24 1747    Specimen: Blood Updated: 07/26/24 1800     Extra Tube Hold for add-ons.     Comment: Auto resulted       CBC & Differential [[id number]]  (Abnormal) Collected: 07/26/24 1747    Specimen: Blood Updated: 07/26/24 1756    Narrative:      The following orders were created for panel order CBC & Differential.  Procedure                               Abnormality         Status                     ---------                               -----------         ------                     CBC Auto Differential[[id number]]         Abnormal            Final result                 Please view results for these tests on the individual orders.    CBC Auto Differential [[id number]]  (Abnormal) Collected: 07/26/24 1747    Specimen: Blood Updated: 07/26/24 1756     WBC 15.11 10*3/mm3      RBC 5.96 10*6/mm3      Hemoglobin 17.4 g/dL      Hematocrit 49.8 %      MCV 83.6 fL      MCH 29.2 pg      MCHC 34.9 g/dL      RDW 12.5 %      RDW-SD 38.3 fl      MPV 9.0 fL      Platelets 389 10*3/mm3      Neutrophil % 80.7 %      Lymphocyte % 12.0 %      Monocyte % 5.6 %      Eosinophil % 0.4 %      Basophil % 0.6 %      Immature Grans % 0.7 %      Neutrophils, Absolute 12.19 10*3/mm3      Lymphocytes, Absolute 1.82 10*3/mm3      Monocytes, Absolute 0.85 10*3/mm3      Eosinophils, Absolute 0.06 10*3/mm3      Basophils, Absolute 0.09 10*3/mm3      Immature Grans, Absolute 0.10 10*3/mm3      nRBC 0.0 /100 WBC           Imaging Results (Last 24 Hours)       Procedure Component Value Units Date/Time    XR Chest 1 View [[id number]] Collected: 07/27/24 0720     Updated: 07/27/24 0723    Narrative:      PROCEDURE: XR CHEST 1 VW-     HISTORY: Chest Pain Triage Protocol; N17.9-Acute kidney failure,  unspecified     COMPARISON: 8/23/2017     FINDINGS:  Portable view of the chest demonstrates the lungs to be  grossly clear. There is no evidence of effusion, pneumothorax or other  significant pleural disease. The mediastinum is unremarkable.     The heart size is normal.       Impression:      Unremarkable portable chest.            This report was signed and finalized on 7/27/2024 7:20 AM by Henry Baker MD.       CT Abdomen Pelvis Without Contrast [[id number]] Collected: 07/26/24 1946     Updated: 07/26/24 2008    Narrative:      FINAL REPORT    TECHNIQUE:  Routine axial images through the abdomen and pelvis were  obtained. This study was performed with techniques to keep  radiation doses as low as reasonably achievable (ALARA).  Individualized dose reduction techniques  using automated  exposure control or adjustment of mA and/or kV according to the  patient's size were employed.    CLINICAL HISTORY:  difficulty urinating, creatinine of 3    FINDINGS:  Abdomen:  The gallbladder is normal.  The solid abdominal organs  and ureters are unremarkable.  The GI tract is unremarkable,  including the appendix.  Pelvis: The urinary bladder is normal.  There is no pelvic or abdominal ascites, adenopathy or acute  osseous abnormality.      Impression:      Unremarkable.    Authenticated and Electronically Signed by Randall Peraza M.D. on  07/26/2024 08:07:32 PM    CT Chest Without Contrast Diagnostic [[id number]] Collected: 07/26/24 1945     Updated: 07/26/24 2004    Narrative:      FINAL REPORT    TECHNIQUE:  Routine axial images were obtained from the lung apices to below  the diaphragm without contrast.This study was performed with  techniques to keep radiation dose as low as reasonably  achievable (ALARA).  Individualized dose techniques using  automated exposure controls or adjustment of mA and/or KV  according to the patient size were employed.    CLINICAL HISTORY:  sternal chest pain, rule out pneumonia    FINDINGS:  The lungs are clear. There is no pleural disease, adenopathy, or  significant osseous abnormality.      Impression:      Unremarkable.    Authenticated and Electronically Signed by Randall Peraza M.D. on  07/26/2024 08:04:00 PM

## 2024-07-27 NOTE — CASE MANAGEMENT/SOCIAL WORK
Discharge Planning Assessment  [facility]     Patient Name: Mo Miller  MRN: [id number]  Today's Date: 7/27/2024    Admit Date: 7/26/2024    Plan: Home with family   Discharge Needs Assessment       Row Name 07/27/24 0826       Living Environment    People in Home sibling(s)    Current Living Arrangements home    In the past 12 months has the electric, gas, oil, or water company threatened to shut off services in your home? No    Primary Care Provided by self    Provides Primary Care For no one    Family Caregiver if Needed sibling(s)    Quality of Family Relationships unable to assess    Able to Return to Prior Arrangements yes       Resource/Environmental Concerns    Resource/Environmental Concerns none    Transportation Concerns none       Transportation Needs    In the past 12 months, has lack of transportation kept you from medical appointments or from getting medications? no    In the past 12 months, has lack of transportation kept you from meetings, work, or from getting things needed for daily living? No       Food Insecurity    Within the past 12 months, you worried that your food would run out before you got the money to buy more. Never true    Within the past 12 months, the food you bought just didn't last and you didn't have money to get more. Never true       Transition Planning    Patient/Family Anticipates Transition to home with family    Patient/Family Anticipated Services at Transition none    Transportation Anticipated family or friend will provide;car, drives self       Discharge Needs Assessment    Readmission Within the Last 30 Days no previous admission in last 30 days    Equipment Currently Used at Home none    Concerns to be Addressed no discharge needs identified    Anticipated Changes Related to Illness none    Equipment Needed After Discharge none    Provided Post Acute Provider List? N/A    Provided Post Acute Provider Quality & Resource List? N/A                   Discharge  Plan       Row Name 07/27/24 0829       Plan    Plan Home with family    Patient/Family in Agreement with Plan yes    Plan Comments Confirmed address and phone number as being correct on face sheet ,Lives with his sister Alesha Does not have a Primary Care provider declining information regarding this  Denies any needs at this time                  Continued Care and Services - Admitted Since 7/26/2024    No active coordination exists for this encounter.          Demographic Summary       Row Name 07/27/24 0824       General Information    Admission Type inpatient    Arrived From emergency department    Referral Source admission list    Reason for Consult discharge planning    Preferred Language English                   Functional Status       Row Name 07/27/24 0825       Functional Status    Usual Activity Tolerance good       Physical Activity    On average, how many days per week do you engage in moderate to strenuous exercise (like a brisk walk)? 7 days  walks every day    On average, how many minutes do you engage in exercise at this level? 20 min    Number of minutes of exercise per week 140       Functional Status, IADL    Medications independent    Meal Preparation independent    Housekeeping independent    Laundry independent    Shopping independent       Mental Status    General Appearance WDL WDL                   Psychosocial    No documentation.                  Abuse/Neglect    No documentation.                  Legal    No documentation.                  Substance Abuse    No documentation.                  Patient Forms    No documentation.                     Lydia Parker, RN

## 2024-07-27 NOTE — CONSULTS
[facility]      Nephrology Consultation      Referring Provider:   No ref. provider found    Reason for Consultation:  Acute kidney injury associated problems.    Subjective:  Chief complaint   Chief Complaint   Patient presents with    Dizziness    Chest Pain     History of present illness:    Patient is 44-year-old male with a history of substance abuse in the past currently on methadone who presented to the ER with multiple complaints reporting that he has been dizzy and it has been gradually worsening.  He said he has been working outside.  Currently laying in the bed awake alert and interactive still saying he is dizzy but orthostatics done this morning was normal.  No orthostasis noted.  Since admission he has been getting IV fluids blood pressures improved significantly on initial presentation his blood pressure was 70 systolic.  CT of the head as well as abdomen was unremarkable.  He did receive 2 L normal saline bolus in the ER.  He also received Zofran and aspirin and was finally admitted to the hospitalist service for further evaluation and treatment.  He was noted to have significant worsening of his renal function, creatinine was noted to be 3.07.  I have reviewed labs/imaging/records from this hospitalization, including ER staff and admitting/attending physicians H/P's and progress notes to establish a comprehensive understanding of this patient's clinical hospital course, as well as to establish plan of care appropriately.   Past Medical History:   Diagnosis Date    Psychosis     Substance abuse        History reviewed. No pertinent surgical history.  History reviewed. No pertinent family history.      Social History     Tobacco Use    Smoking status: Every Day     Current packs/day: 1.00     Types: Cigarettes    Smokeless tobacco: Never   Vaping Use    Vaping status: Never Used   Substance Use Topics    Alcohol use: Yes     Comment: rarely    Drug use: Not Currently     Types:  IV, Methamphetamines     Comment: states its been 4-5 months heroin and meth     Home medications:   Prior to Admission Medications       Prescriptions Last Dose Informant Patient Reported? Taking?    acyclovir (ZOVIRAX) 800 MG tablet 7/26/2024  Yes Yes    Take 1 tablet by mouth 5 (Five) Times a Day.    lisinopril (PRINIVIL,ZESTRIL) 20 MG tablet 7/26/2024  Yes Yes    Take 1 tablet by mouth Daily.    methadone (DOLOPHINE) 10 MG tablet 7/26/2024  Yes Yes    Take 4 tablets by mouth Daily,          Emergency department medications:   Medications   sodium chloride 0.9 % flush 10 mL (has no administration in time range)   nicotine (NICODERM CQ) 21 MG/24HR patch 1 patch (1 patch Transdermal Medication Applied 7/26/24 1935)   sodium chloride 0.9 % flush 10 mL (10 mL Intravenous Given 7/27/24 0807)   sodium chloride 0.9 % flush 10 mL (has no administration in time range)   sodium chloride 0.9 % infusion 40 mL (has no administration in time range)   acetaminophen (TYLENOL) tablet 650 mg (has no administration in time range)     Or   acetaminophen (TYLENOL) 160 MG/5ML oral solution 650 mg (has no administration in time range)     Or   acetaminophen (TYLENOL) suppository 650 mg (has no administration in time range)   ondansetron (ZOFRAN) injection 4 mg (has no administration in time range)   heparin (porcine) 5000 UNIT/ML injection 5,000 Units (5,000 Units Subcutaneous Given 7/27/24 0806)   nitroglycerin (NITROSTAT) SL tablet 0.4 mg (has no administration in time range)   sodium chloride 0.9 % infusion (125 mL/hr Intravenous New Bag 7/27/24 0055)   acetaminophen (TYLENOL) tablet 650 mg (has no administration in time range)     Or   acetaminophen (TYLENOL) 160 MG/5ML oral solution 650 mg (has no administration in time range)     Or   acetaminophen (TYLENOL) suppository 650 mg (has no administration in time range)   sennosides-docusate (PERICOLACE) 8.6-50 MG per tablet 2 tablet (has no administration in time range)     And  "  polyethylene glycol (MIRALAX) packet 17 g (has no administration in time range)     And   bisacodyl (DULCOLAX) EC tablet 5 mg (has no administration in time range)     And   bisacodyl (DULCOLAX) suppository 10 mg (has no administration in time range)   melatonin tablet 5 mg (has no administration in time range)   methadone (DOLOPHINE) tablet 40 mg (40 mg Oral Given 7/27/24 0805)   sodium chloride 0.9 % bolus 1,000 mL (0 mL Intravenous Stopped 7/26/24 1931)   ondansetron (ZOFRAN) injection 4 mg (4 mg Intravenous Given 7/26/24 1752)   aspirin tablet 325 mg (325 mg Oral Given 7/26/24 1752)   sodium chloride 0.9 % bolus 1,000 mL (0 mL Intravenous Stopped 7/26/24 2108)       Allergies:  Penicillins    Review of Systems  14 point review of system were done and are negative except as mentioned in the history of present illness and assessment and plan.      Physical Exam:  Objective:  Vital Signs  /59 (BP Location: Right arm, Patient Position: Lying)   Pulse 88   Temp 97.9 °F (36.6 °C) (Oral)   Resp 18   Ht 180 cm (70.87\")   Wt 75.6 kg (166 lb 10.7 oz)   SpO2 97%   BMI 23.33 kg/m²   Objective    No intake/output data recorded.    Intake/Output Summary (Last 24 hours) at 7/27/2024 0838  Last data filed at 7/27/2024 0629  Gross per 24 hour   Intake 1935.83 ml   Output --   Net 1935.83 ml        Physical Exam     Constitutional: Awake, alert  Eyes: sclerae anicteric, no conjunctival injection  HEENT: mucous membranes dry  Neck: Supple, no thyromegaly, no lymphadenopathy, trachea midline, No JVD  Respiratory: Clear to auscultation bilaterally, nonlabored respirations   Cardiovascular: RRR, no murmurs, rubs, or gallops.  Gastrointestinal: Positive bowel sounds, obese, soft, nontender, nondistended  Musculoskeletal: No edema, no clubbing or cyanosis  Psychiatric: Appropriate affect, cooperative  Neurologic: Oriented x 3, moving all extremities, Cranial Nerves grossly intact, speech clear  Skin: warm and dry, no " "rashes            Results Review:   Results from last 7 days   Lab Units 07/27/24  0643 07/26/24  1747   SODIUM mmol/L 144 139   POTASSIUM mmol/L 4.0 4.9   CHLORIDE mmol/L 104 96*   CO2 mmol/L 25.8 27.6   BUN mg/dL 23* 24*   CREATININE mg/dL 1.17 3.07*   CALCIUM mg/dL 8.3* 10.8*   ALBUMIN g/dL  --  5.1   BILIRUBIN mg/dL  --  0.5   ALK PHOS U/L  --  188*   ALT (SGPT) U/L  --  33   AST (SGOT) U/L  --  38   GLUCOSE mg/dL 97 127*     Estimated Creatinine Clearance: 87.1 mL/min (by C-G formula based on SCr of 1.17 mg/dL).          Results from last 7 days   Lab Units 07/27/24  0643 07/26/24  1747   WBC 10*3/mm3 10.00 15.11*   HEMOGLOBIN g/dL 13.6 17.4   PLATELETS 10*3/mm3 289 389         Brief Urine Lab Results  (Last result in the past 365 days)        Color   Clarity   Blood   Leuk Est   Nitrite   Protein   CREAT   Urine HCG        07/26/24 2134 Yellow   Cloudy   Moderate (2+)   Negative   Negative   100 mg/dL (2+)                 No results found for: \"UTPCR\"  Imaging Results (Last 24 Hours)       Procedure Component Value Units Date/Time    XR Chest 1 View [[id number]] Collected: 07/27/24 0720     Updated: 07/27/24 0723    Narrative:      PROCEDURE: XR CHEST 1 VW-     HISTORY: Chest Pain Triage Protocol; N17.9-Acute kidney failure,  unspecified     COMPARISON: 8/23/2017     FINDINGS:  Portable view of the chest demonstrates the lungs to be  grossly clear. There is no evidence of effusion, pneumothorax or other  significant pleural disease. The mediastinum is unremarkable.     The heart size is normal.       Impression:      Unremarkable portable chest.            This report was signed and finalized on 7/27/2024 7:20 AM by Henry Baker MD.       CT Abdomen Pelvis Without Contrast [[id number]] Collected: 07/26/24 1946     Updated: 07/26/24 2008    Narrative:      FINAL REPORT    TECHNIQUE:  Routine axial images through the abdomen and pelvis were  obtained. This study was performed with techniques to keep  radiation " doses as low as reasonably achievable (ALARA).  Individualized dose reduction techniques using automated  exposure control or adjustment of mA and/or kV according to the  patient's size were employed.    CLINICAL HISTORY:  difficulty urinating, creatinine of 3    FINDINGS:  Abdomen:  The gallbladder is normal.  The solid abdominal organs  and ureters are unremarkable.  The GI tract is unremarkable,  including the appendix.  Pelvis: The urinary bladder is normal.  There is no pelvic or abdominal ascites, adenopathy or acute  osseous abnormality.      Impression:      Unremarkable.    Authenticated and Electronically Signed by Randall Peraza M.D. on  07/26/2024 08:07:32 PM    CT Chest Without Contrast Diagnostic [[id number]] Collected: 07/26/24 1945     Updated: 07/26/24 2004    Narrative:      FINAL REPORT    TECHNIQUE:  Routine axial images were obtained from the lung apices to below  the diaphragm without contrast.This study was performed with  techniques to keep radiation dose as low as reasonably  achievable (ALARA).  Individualized dose techniques using  automated exposure controls or adjustment of mA and/or KV  according to the patient size were employed.    CLINICAL HISTORY:  sternal chest pain, rule out pneumonia    FINDINGS:  The lungs are clear. There is no pleural disease, adenopathy, or  significant osseous abnormality.      Impression:      Unremarkable.    Authenticated and Electronically Signed by Randall Peraza M.D. on  07/26/2024 08:04:00 PM          heparin (porcine), 5,000 Units, Subcutaneous, Q12H  methadone, 40 mg, Oral, Daily  nicotine, 1 patch, Transdermal, Once  sodium chloride, 10 mL, Intravenous, Q12H      sodium chloride, 125 mL/hr, Last Rate: 125 mL/hr (07/27/24 0055)        Assessment/Plan:      ZEYAD (acute kidney injury)    Acute kidney injury: Most likely secondary to dehydration patient was also on lisinopril that might also be contributing to it.  Significant improvement with hydration noted.  He  was noted to have moderate amount of blood and protein in the urine, will recheck UA.  I will also check a spot protein creatinine ratio.  Dizziness: He still feels dizzy but not orthostatic.  Continue with hydration for now.  Methadone dependence: Continue home dose of methadone      Risk and complexity: High       Plan:  Change IV fluids to LR at 125 cc an hour.  Check UA and spot protein creatinine ratio.  Continue with rest of the current treatment plan and surveillance labs.  Details were discussed with the patient no family in the room.    Details were also discussed with the hospitalist service.   Further recommendations will depend on clinical course of the patient during the current hospitalization.    I also discussed the details with the nursing staff.  Rest as ordered.    In closing, I sincerely appreciate opportunity to participate in care of this patient. If I can be of any further assistance with the management of this patient, please don’t hesitate to contact me.    Donny Simons MD, МАРИЯ    07/27/24  08:38 EDT    Dictated using Dragon.

## 2024-07-27 NOTE — PLAN OF CARE
Goal Outcome Evaluation:  Plan of Care Reviewed With: patient        Progress: improving  Outcome Evaluation: VSS, PT UP IN ROOM,  DENIES ANY FURTHER DIZZINESS.

## 2024-07-27 NOTE — PLAN OF CARE
Goal Outcome Evaluation:  Plan of Care Reviewed With: patient           Outcome Evaluation: No acute events this shift. IV Fluids changed at this time. Complaints of dizziness. Will continue to monitor.

## 2024-07-27 NOTE — H&P
[facility] HOSPITALIST   HISTORY AND PHYSICAL      Name:  Mo Miller   Age:  44 y.o.  Sex:  male  :  1980  MRN:  [id number]   Visit Number:  [id number]  Admission Date:  2024  Date Of Service:  24  Primary Care Physician:  Provider, No Known    Chief Complaint:     Dizziness, nausea, vomiting    History Of Presenting Illness:      Patient is a 44 years old male with a past medical history of substance abuse, currently on methadone who presented to the ER with multiple complaints.  Patient reports that he has had dizziness for a long time however over the past 2 days he has noted worsening dizziness.  He also reports intractable nausea and vomiting to the point where he was not able to tolerate even water and would throw up every time he drinks.  Patient denies any headaches, fever, chills, weakness or numbness.  He had earlier reported chest pain x 1 day in the ER but currently denies any chest pain on my evaluation.  He reports decreased urine output but no dysuria or urinary symptoms otherwise.  Patient was diagnosed with shingles on his posterior neck on  and was treated with acyclovir 800 mg 5 times a day which she has been taking since then.  Patient denies shortness of breath, cough or diarrhea.    On ER evaluation, patient was found to be hypotensive with a blood pressure of 70s to 80s systolic in the ER, afebrile, on room air. His workup was significant for creatinine of 3.0 (baseline creatinine 0.9) BUN 24, CK3 122, WBC 15.1.  HS Troponin 9-<6.  Urinalysis negative for nitrates or leukocytes.  UDS positive for methadone which is prescribed chronically CT chest without contrast unremarkable.  CT abdomen pelvis without contrast unremarkable.  Patient received 2 L boluses in the ER of normal saline with improvement on his blood pressure, he also received Zofran and aspirin.  Hospitalist consulted for admission, further management and treatment.    Review Of  "Systems:    All systems were reviewed and negative except as mentioned in history of presenting illness, assessment and plan.    Past Medical History: Patient  has a past medical history of Psychosis and Substance abuse.    Past Surgical History: Patient  has no past surgical history on file.    Social History: Patient  reports that he has been smoking cigarettes. He has never used smokeless tobacco. He reports current alcohol use. He reports that he does not currently use drugs after having used the following drugs: IV and Methamphetamines.    Family History:  Patient's family history has been reviewed and found to be noncontributory.     Allergies:      Penicillins    Home Medications:    Prior to Admission Medications       None          ED Medications:    Medications   sodium chloride 0.9 % flush 10 mL (has no administration in time range)   nicotine (NICODERM CQ) 21 MG/24HR patch 1 patch (1 patch Transdermal Medication Applied 7/26/24 1935)   sodium chloride 0.9 % bolus 1,000 mL (0 mL Intravenous Stopped 7/26/24 1931)   ondansetron (ZOFRAN) injection 4 mg (4 mg Intravenous Given 7/26/24 1752)   aspirin tablet 325 mg (325 mg Oral Given 7/26/24 1752)   sodium chloride 0.9 % bolus 1,000 mL (0 mL Intravenous Stopped 7/26/24 2108)     Vital Signs:  Temp:  [97.8 °F (36.6 °C)-98.1 °F (36.7 °C)] 97.8 °F (36.6 °C)  Heart Rate:  [57-84] 64  Resp:  [11-27] 20  BP: ()/(54-80) 100/80        07/26/24  1733   Weight: 76.2 kg (168 lb)     Body mass index is 23.43 kg/m².    Physical Exam:     Most recent vital Signs: /80   Pulse 64   Temp 97.8 °F (36.6 °C) (Oral)   Resp 20   Ht 180.3 cm (71\")   Wt 76.2 kg (168 lb)   SpO2 94%   BMI 23.43 kg/m²     Physical Exam  Vitals and nursing note reviewed.   Constitutional:       General: He is not in acute distress.     Appearance: He is ill-appearing.   HENT:      Head: Normocephalic and atraumatic.      Nose: Nose normal.      Mouth/Throat:      Mouth: Mucous " membranes are dry.   Eyes:      Extraocular Movements: Extraocular movements intact.      Conjunctiva/sclera: Conjunctivae normal.      Pupils: Pupils are equal, round, and reactive to light.   Cardiovascular:      Rate and Rhythm: Normal rate and regular rhythm.      Pulses: Normal pulses.      Heart sounds: Normal heart sounds.   Pulmonary:      Effort: Pulmonary effort is normal.      Breath sounds: Normal breath sounds. No wheezing or rhonchi.   Abdominal:      General: Bowel sounds are normal. There is no distension.      Palpations: Abdomen is soft.      Tenderness: There is no abdominal tenderness.   Musculoskeletal:         General: Normal range of motion.      Cervical back: Normal range of motion and neck supple.      Right lower leg: No edema.      Left lower leg: No edema.   Skin:     General: Skin is warm and dry.      Findings: Rash present.             Comments: Dried crusted vesicular rash on the on the right side posterior neck, nontender, no drainage or signs of infection.   Neurological:      General: No focal deficit present.      Mental Status: He is alert and oriented to person, place, and time. Mental status is at baseline.      Cranial Nerves: No cranial nerve deficit.      Sensory: No sensory deficit.      Motor: No weakness.   Psychiatric:         Mood and Affect: Mood normal.         Behavior: Behavior normal.         Thought Content: Thought content normal.         Laboratory data:    I have reviewed the labs done in the emergency room.    Results from last 7 days   Lab Units 07/26/24  1747   SODIUM mmol/L 139   POTASSIUM mmol/L 4.9   CHLORIDE mmol/L 96*   CO2 mmol/L 27.6   BUN mg/dL 24*   CREATININE mg/dL 3.07*   CALCIUM mg/dL 10.8*   BILIRUBIN mg/dL 0.5   ALK PHOS U/L 188*   ALT (SGPT) U/L 33   AST (SGOT) U/L 38   GLUCOSE mg/dL 127*     Results from last 7 days   Lab Units 07/26/24  1747   WBC 10*3/mm3 15.11*   HEMOGLOBIN g/dL 17.4   HEMATOCRIT % 49.8   PLATELETS 10*3/mm3 389        "  Results from last 7 days   Lab Units 07/26/24  1747   CK TOTAL U/L 322*   HSTROP T ng/L 9                           Invalid input(s): \"USDES\", \"NITRITITE\", \"BACT\", \"EP\"    Pain Management Panel  More data exists         Latest Ref Rng & Units 4/24/2023 2/20/2023   Pain Management Panel   Amphetamine, Urine Qual Negative Negative  Negative    Barbiturates Screen, Urine Negative Negative  Negative    Benzodiazepine Screen, Urine Negative Negative  Negative    Buprenorphine, Screen, Urine Negative Negative  Negative    Cocaine Screen, Urine Negative Negative  Negative    Methadone Screen , Urine Negative Negative  Negative    Methamphetamine, Ur Negative Negative  Negative       Details                   EKG:      Sinus rhythm, heart rate 73, nonspecific ST/T wave changes.    Radiology:    CT Abdomen Pelvis Without Contrast    Result Date: 7/26/2024  FINAL REPORT TECHNIQUE: Routine axial images through the abdomen and pelvis were obtained. This study was performed with techniques to keep radiation doses as low as reasonably achievable (ALARA). Individualized dose reduction techniques using automated exposure control or adjustment of mA and/or kV according to the patient's size were employed. CLINICAL HISTORY: difficulty urinating, creatinine of 3 FINDINGS: Abdomen:  The gallbladder is normal.  The solid abdominal organs and ureters are unremarkable.  The GI tract is unremarkable, including the appendix.  Pelvis: The urinary bladder is normal. There is no pelvic or abdominal ascites, adenopathy or acute osseous abnormality.     Unremarkable. Authenticated and Electronically Signed by Randall Peraza M.D. on 07/26/2024 08:07:32 PM    CT Chest Without Contrast Diagnostic    Result Date: 7/26/2024  FINAL REPORT TECHNIQUE: Routine axial images were obtained from the lung apices to below the diaphragm without contrast.This study was performed with techniques to keep radiation dose as low as reasonably achievable (ALARA).  " Individualized dose techniques using automated exposure controls or adjustment of mA and/or KV according to the patient size were employed. CLINICAL HISTORY: sternal chest pain, rule out pneumonia FINDINGS: The lungs are clear. There is no pleural disease, adenopathy, or significant osseous abnormality.     Unremarkable. Authenticated and Electronically Signed by Randall Peraza M.D. on 07/26/2024 08:04:00 PM     Assessment:    Acute kidney injury, POA  Hypotension, unspecified, POA  Shingles, posterior neck  Hypertension  History of substance abuse  Chronic methadone  hepatitis C    Plan:    Patient is admitted for further management and treatment.    Acute kidney injury  -Multiple contributing factors including recent dehydration/nausea/vomiting.  Also acyclovir nephrotoxicity versus hypotension.  -Avoid nephrotoxic drugs  -Discontinue acyclovir  -Hold lisinopril  -Continue IV fluids  -Nephrology consulted, appreciate recommendations.    Hypotension, unspecified  -Patient reports that he took his lisinopril twice today and thought that would help on his dizziness.  -Received IV fluid boluses in the ER with improvement on his blood pressure  -Continue IV fluids  -Hold lisinopril  -No signs of infection    Posterior neck singles  -Appears to be crusted and dried vesicles now  -Will discontinue acyclovir  -Denies any pain, no secondary infection    -Continue home meds as warranted.  -Further orders as indicated per clinical course.    Risk Assessment: High  DVT Prophylaxis: Heparin prophylaxis (benefit> risk)  Code Status: Full  Diet: Renal    Advance Care Planning   ACP discussion was held with the patient during this visit. Patient does not have an advance directive, information provided.       Richi Branch MD  07/26/24  21:10 EDT    Dictated utilizing Dragon dictation.

## 2024-07-28 VITALS
HEIGHT: 71 IN | WEIGHT: 166.67 LBS | TEMPERATURE: 97.7 F | SYSTOLIC BLOOD PRESSURE: 108 MMHG | OXYGEN SATURATION: 95 % | HEART RATE: 61 BPM | DIASTOLIC BLOOD PRESSURE: 72 MMHG | RESPIRATION RATE: 18 BRPM | BODY MASS INDEX: 23.33 KG/M2

## 2024-07-28 LAB
ANION GAP SERPL CALCULATED.3IONS-SCNC: 9.4 MMOL/L (ref 5–15)
BUN SERPL-MCNC: 16 MG/DL (ref 6–20)
BUN/CREAT SERPL: 21.3 (ref 7–25)
CALCIUM SPEC-SCNC: 8.6 MG/DL (ref 8.6–10.5)
CHLORIDE SERPL-SCNC: 103 MMOL/L (ref 98–107)
CK SERPL-CCNC: 117 U/L (ref 20–200)
CO2 SERPL-SCNC: 26.6 MMOL/L (ref 22–29)
CREAT SERPL-MCNC: 0.75 MG/DL (ref 0.76–1.27)
CREAT UR-MCNC: 157.8 MG/DL
DEPRECATED RDW RBC AUTO: 39.4 FL (ref 37–54)
EGFRCR SERPLBLD CKD-EPI 2021: 114.8 ML/MIN/1.73
ERYTHROCYTE [DISTWIDTH] IN BLOOD BY AUTOMATED COUNT: 12.6 % (ref 12.3–15.4)
GLUCOSE SERPL-MCNC: 94 MG/DL (ref 65–99)
HCT VFR BLD AUTO: 37.2 % (ref 37.5–51)
HGB BLD-MCNC: 12.8 G/DL (ref 13–17.7)
MCH RBC QN AUTO: 29.5 PG (ref 26.6–33)
MCHC RBC AUTO-ENTMCNC: 34.4 G/DL (ref 31.5–35.7)
MCV RBC AUTO: 85.7 FL (ref 79–97)
PLATELET # BLD AUTO: 249 10*3/MM3 (ref 140–450)
PMV BLD AUTO: 9.2 FL (ref 6–12)
POTASSIUM SERPL-SCNC: 4.5 MMOL/L (ref 3.5–5.2)
PROT ?TM UR-MCNC: 27.6 MG/DL
PROT/CREAT UR: 174.9 MG/G CREA (ref 0–200)
RBC # BLD AUTO: 4.34 10*6/MM3 (ref 4.14–5.8)
SODIUM SERPL-SCNC: 139 MMOL/L (ref 136–145)
WBC NRBC COR # BLD AUTO: 8.09 10*3/MM3 (ref 3.4–10.8)

## 2024-07-28 PROCEDURE — 85027 COMPLETE CBC AUTOMATED: CPT | Performed by: NURSE PRACTITIONER

## 2024-07-28 PROCEDURE — 80048 BASIC METABOLIC PNL TOTAL CA: CPT | Performed by: NURSE PRACTITIONER

## 2024-07-28 PROCEDURE — 99238 HOSP IP/OBS DSCHRG MGMT 30/<: CPT | Performed by: INTERNAL MEDICINE

## 2024-07-28 PROCEDURE — 25010000002 HEPARIN (PORCINE) PER 1000 UNITS: Performed by: FAMILY MEDICINE

## 2024-07-28 PROCEDURE — 82550 ASSAY OF CK (CPK): CPT | Performed by: NURSE PRACTITIONER

## 2024-07-28 PROCEDURE — 25810000003 LACTATED RINGERS PER 1000 ML: Performed by: INTERNAL MEDICINE

## 2024-07-28 RX ADMIN — Medication 10 ML: at 08:08

## 2024-07-28 RX ADMIN — SODIUM CHLORIDE, POTASSIUM CHLORIDE, SODIUM LACTATE AND CALCIUM CHLORIDE 150 ML/HR: 600; 310; 30; 20 INJECTION, SOLUTION INTRAVENOUS at 01:54

## 2024-07-28 RX ADMIN — HEPARIN SODIUM 5000 UNITS: 5000 INJECTION INTRAVENOUS; SUBCUTANEOUS at 08:08

## 2024-07-28 RX ADMIN — METHADONE HYDROCHLORIDE 40 MG: 10 TABLET ORAL at 08:08

## 2024-07-28 NOTE — PLAN OF CARE
Goal Outcome Evaluation:  Plan of Care Reviewed With: patient        Progress: improving  Outcome Evaluation: Patient appropriate for discharge per Dr. Krueger

## 2024-07-28 NOTE — CASE MANAGEMENT/SOCIAL WORK
Case Management Discharge Note           Provided Post Acute Provider List?: N/A  Provided Post Acute Provider Quality & Resource List?: N/A    Selected Continued Care - Admitted Since 7/26/2024       Destination    No services have been selected for the patient.                Durable Medical Equipment    No services have been selected for the patient.                Dialysis/Infusion    No services have been selected for the patient.                Home Medical Care    No services have been selected for the patient.                Therapy    No services have been selected for the patient.                Community Resources    No services have been selected for the patient.                Community & DME    No services have been selected for the patient.                    Transportation Services  Private: Car    Final Discharge Disposition Code: 01 - home or self-care

## 2024-07-28 NOTE — PLAN OF CARE
Goal Outcome Evaluation:  Plan of Care Reviewed With: patient        Progress: improving  Outcome Evaluation: VS stable throughout the shift. Complains of dizziness when standing up, educated to stand slowly. IV fluids running throughout the shift, no significant events. continuing to monitor

## 2024-07-28 NOTE — PROGRESS NOTES
[facility] Progress Note  [facility]. [place]        Patient Name: Mo Miller  : 1980  MRN: [id number]   LOS: 2 days    Patient Care Team:  Provider, No Known as PCP - General    Chief Complaint:    Chief Complaint   Patient presents with    Dizziness    Chest Pain     Primary Care Physician:  Provider, No Known  Date of admission: 2024    Subjective     Interval History:   Follow-up acute kidney injury  Events noted from last 24 hours.  I reviewed the chart and other providers notes, labs and procedures done since my last note.  Feels well want to go home.    Review of Systems:   As noted above.    Objective     Vitals:   Temp:  [97.6 °F (36.4 °C)-97.8 °F (36.6 °C)] 97.7 °F (36.5 °C)  Heart Rate:  [51-67] 61  Resp:  [16-18] 18  BP: (102-126)/(54-75) 108/72    Intake/Output Summary (Last 24 hours) at 2024 0738  Last data filed at 2024 0300  Gross per 24 hour   Intake 2070.83 ml   Output 1850 ml   Net 220.83 ml       Physical Exam:    General Appearance: alert, oriented x 3, no acute distress   Skin: warm and dry  HEENT: oral mucosa normal, nonicteric sclera  Neck: supple, no JVD  Lungs: CTA  Heart: RRR, normal S1 and S2  Abdomen: obese, soft, nontender, non distended and positive bowel sounds.  : no palpable bladder  Extremities: Trace edema, no cyanosis or clubbing  Neuro: normal speech and mental status     Scheduled Meds:     Current Facility-Administered Medications   Medication Dose Route Frequency Provider Last Rate Last Admin    acetaminophen (TYLENOL) tablet 650 mg  650 mg Oral Q4H PRN Richi Branch MD        Or    acetaminophen (TYLENOL) 160 MG/5ML oral solution 650 mg  650 mg Oral Q4H PRN Richi Branch MD        Or    acetaminophen (TYLENOL) suppository 650 mg  650 mg Rectal Q4H PRN Richi Branch MD        acetaminophen (TYLENOL) tablet 650 mg  650 mg Oral Q4H PRN Richi Branch MD        Or    acetaminophen (TYLENOL)  160 MG/5ML oral solution 650 mg  650 mg Oral Q4H PRN Richi Branch MD        Or    acetaminophen (TYLENOL) suppository 650 mg  650 mg Rectal Q4H PRN Richi Branch MD        sennosides-docusate (PERICOLACE) 8.6-50 MG per tablet 2 tablet  2 tablet Oral BID PRN Richi Branch MD        And    polyethylene glycol (MIRALAX) packet 17 g  17 g Oral Daily PRN Richi Branch MD        And    bisacodyl (DULCOLAX) EC tablet 5 mg  5 mg Oral Daily PRN Richi Branch MD        And    bisacodyl (DULCOLAX) suppository 10 mg  10 mg Rectal Daily PRN iRchi Branch MD        calcium carbonate (TUMS) chewable tablet 500 mg (200 mg elemental)  2 tablet Oral TID PRN Latrice Mccormick, APRN   2 tablet at 07/27/24 0923    heparin (porcine) 5000 UNIT/ML injection 5,000 Units  5,000 Units Subcutaneous Q12H Richi Branch MD   5,000 Units at 07/27/24 2016    lactated ringers infusion  150 mL/hr Intravenous Continuous Donny Simons MD, FASN 150 mL/hr at 07/28/24 0154 150 mL/hr at 07/28/24 0154    meclizine (ANTIVERT) tablet 12.5 mg  12.5 mg Oral TID PRN Latrice Mccormick, APRN   12.5 mg at 07/27/24 1308    melatonin tablet 5 mg  5 mg Oral Nightly PRN Richi Branch MD        methadone (DOLOPHINE) tablet 40 mg  40 mg Oral Daily Richi Branch MD   40 mg at 07/27/24 0805    [COMPLETED] nicotine (NICODERM CQ) 21 MG/24HR patch 1 patch  1 patch Transdermal Once Walker Thomas PA-C   1 patch at 07/27/24 1700    nitroglycerin (NITROSTAT) SL tablet 0.4 mg  0.4 mg Sublingual Q5 Min PRN Richi Branch MD        ondansetron (ZOFRAN) injection 4 mg  4 mg Intravenous Q6H PRN Richi Branch MD   4 mg at 07/27/24 1614    sodium chloride 0.9 % flush 10 mL  10 mL Intravenous PRN Richi Branch MD        sodium chloride 0.9 % flush 10 mL  10 mL Intravenous Q12H Richi Branch MD   10 mL at 07/27/24 2017    sodium chloride 0.9 % flush 10 mL  10 mL Intravenous PRN Richi Branch MD        sodium chloride 0.9 % infusion 40 mL  40  "mL Intravenous PRN Richi Branch MD           heparin (porcine), 5,000 Units, Subcutaneous, Q12H  methadone, 40 mg, Oral, Daily  [COMPLETED] nicotine, 1 patch, Transdermal, Once  sodium chloride, 10 mL, Intravenous, Q12H        IV Meds:   lactated ringers, 150 mL/hr, Last Rate: 150 mL/hr (07/28/24 0154)        Results Reviewed:   I have personally reviewed the results from the time of this admission to 7/28/2024 07:38 EDT     Results from last 7 days   Lab Units 07/28/24  0506 07/27/24  0643 07/26/24  1747   SODIUM mmol/L 139 144 139   POTASSIUM mmol/L 4.5 4.0 4.9   CHLORIDE mmol/L 103 104 96*   CO2 mmol/L 26.6 25.8 27.6   BUN mg/dL 16 23* 24*   CREATININE mg/dL 0.75* 1.17 3.07*   CALCIUM mg/dL 8.6 8.3* 10.8*   BILIRUBIN mg/dL  --   --  0.5   ALK PHOS U/L  --   --  188*   ALT (SGPT) U/L  --   --  33   AST (SGOT) U/L  --   --  38   GLUCOSE mg/dL 94 97 127*       Estimated Creatinine Clearance: 135.8 mL/min (A) (by C-G formula based on SCr of 0.75 mg/dL (L)).                Results from last 7 days   Lab Units 07/28/24  0506 07/27/24  0643 07/26/24  1747   WBC 10*3/mm3 8.09 10.00 15.11*   HEMOGLOBIN g/dL 12.8* 13.6 17.4   PLATELETS 10*3/mm3 249 289 389             Brief Urine Lab Results  (Last result in the past 365 days)        Color   Clarity   Blood   Leuk Est   Nitrite   Protein   CREAT   Urine HCG        07/27/24 1615 Yellow   Clear   Negative   Negative   Negative   30 mg/dL (1+)                   No results found for: \"UTPCR\"    Imaging Results (Last 24 Hours)       ** No results found for the last 24 hours. **                Assessment / Plan     ASSESSMENT:    ZEYAD (acute kidney injury)    Hypotension    Hypovolemia    Methadone use    Leukocytosis    Elevated CK    Essential hypertension    Acute kidney injury: Most likely secondary to dehydration patient was also on lisinopril that might also be contributing to it.  Significant improvement with hydration noted.  He was noted to have moderate amount of " blood and protein in the urine, will recheck UA.  I will also check a spot protein creatinine ratio.  Dizziness: He still feels dizzy but not orthostatic.  Continue with hydration for now.  Methadone dependence: Continue home dose of methadone      PLAN:  Clinically back to about baseline, okay to discharge home.  Does not need any follow-up with me.  Details were discussed with the patient no family in the room.    Details were also discussed with the hospitalist service and or other providers as needed.   Continue with rest of the current treatment plan, and monitor with surveillance labs.  Further recommendations will depend on clinical course of the patient during the current hospitalization.   I have reviewed the copied text to this note, it was edited and the changes made as needed.  It is accurate to the point, when the note was signed today.     Thank you for involving us in the care of Mo Miller.  Please feel free to call with any questions.    Donny Simons MD, FASN  07/28/24  07:38 EDT    [facility]  [phone]      Part of this note may be an electronic transcription/translation of spoken language to printed text using the Dragon Dictation System.

## 2024-07-28 NOTE — DISCHARGE SUMMARY
[facility]   INTERNAL MEDICINE DISCHARGE SUMMARY      Name:  Mo Miller   Age:  44 y.o.  Sex:  male  :  1980  MRN:  [id number]   Visit Number:  [id number]  Primary Care Physician:  Provider, No Known  Date of Discharge:  2024  Admission Date:  2024      Discharge Diagnosis:         ZEYAD (acute kidney injury)    Methadone use    Leukocytosis    Elevated CK          Consults:     Consults       Date and Time Order Name Status Description    2024  7:23 AM Inpatient Nephrology Consult Completed             Procedures Performed:                 Hospital Course:   The patient was admitted on 2024  Please see H&P for details on admission HPI and ROS.  Patient is a 44 years old male with a past medical history of substance abuse, currently on methadone who presented to the ER with multiple complaints.  Patient reports that he has had dizziness for a long time however over the past 2 days he has noted worsening dizziness.  He also reports intractable nausea and vomiting to the point where he was not able to tolerate even water and would throw up every time he drinks.  Patient denies any headaches, fever, chills, weakness or numbness.  He had earlier reported chest pain x 1 day in the ER but currently denies any chest pain on my evaluation.  He reports decreased urine output but no dysuria or urinary symptoms otherwise.  Patient was diagnosed with shingles on his posterior neck on  and was treated with acyclovir 800 mg 5 times a day which she has been taking since then.  Patient denies shortness of breath, cough or diarrhea.     On ER evaluation, patient was found to be hypotensive with a blood pressure of 70s to 80s systolic in the ER, afebrile, on room air. His workup was significant for creatinine of 3.0 (baseline creatinine 0.9) BUN 24, CK3 122, WBC 15.1.  HS Troponin 9-<6.  Urinalysis negative for nitrates or leukocytes.  UDS positive for methadone which is  prescribed chronically CT chest without contrast unremarkable.  CT abdomen pelvis without contrast unremarkable.  Patient received 2 L boluses in the ER of normal saline with improvement on his blood pressure, he also received Zofran and aspirin.  Hospitalist consulted for admission, further management and treatment.    After admission he was treated with IV fluids and aggressive hydration.  His renal function completely improved and has normalized today patient has been feeling better nausea vomiting has resolved.  He is blood pressure was also low and he was on lisinopril which was stopped and after hydration blood pressure has been in the 110 range.  His lisinopril has been stopped he does take methadone for chronic pain and he will be discharged only on methadone and follow-up with PCP.    Vital Signs:     Temp:  [97.6 °F (36.4 °C)-97.8 °F (36.6 °C)] 97.7 °F (36.5 °C)  Heart Rate:  [51-67] 61  Resp:  [16-18] 18  BP: (102-126)/(54-75) 108/72    Physical Exam:     General Appearance:    Alert and cooperative, not in any acute distress.   Head:    Atraumatic and normocephalic, without obvious abnormality.   Eyes:            PERRLA,  No pallor. Extraocular movements are within normal limits.   Neck:   Supple,  No lymph glands, no bruit   Lungs:     Chest shape is normal. Breath sounds heard bilaterally equally.  No crackles or wheezing.     Heart:    Normal S1 and S2, no murmur,  No JVD   Abdomen:     Normal bowel sounds, no masses, no organomegaly. Soft     nontender, no guarding, no rebound tenderness   Extremities:   Moves all extremities well, no edema, no cyanosis,    Skin:   No  bruising or rash.   Neurologic:   Grossly nonfocal and moves all extremities.        Pertinent Lab Results:     Results from last 7 days   Lab Units 07/28/24  0506 07/27/24  0643 07/26/24  1747   SODIUM mmol/L 139 144 139   POTASSIUM mmol/L 4.5 4.0 4.9   CHLORIDE mmol/L 103 104 96*   CO2 mmol/L 26.6 25.8 27.6   BUN mg/dL 16 23* 24*  "  CREATININE mg/dL 0.75* 1.17 3.07*   CALCIUM mg/dL 8.6 8.3* 10.8*   BILIRUBIN mg/dL  --   --  0.5   ALK PHOS U/L  --   --  188*   ALT (SGPT) U/L  --   --  33   AST (SGOT) U/L  --   --  38   GLUCOSE mg/dL 94 97 127*     Results from last 7 days   Lab Units 07/28/24  0506 07/27/24  0643 07/26/24  1747   WBC 10*3/mm3 8.09 10.00 15.11*   HEMOGLOBIN g/dL 12.8* 13.6 17.4   HEMATOCRIT % 37.2* 39.4 49.8   PLATELETS 10*3/mm3 249 289 389         No results found for: \"BLOODCX\", \"URINECX\", \"WOUNDCX\", \"MRSACX\"    Pertinent Radiology Results:    Imaging Results (Most Recent)       Procedure Component Value Units Date/Time    XR Chest 1 View [[id number]] Collected: 07/27/24 0720     Updated: 07/27/24 0723    Narrative:      PROCEDURE: XR CHEST 1 VW-     HISTORY: Chest Pain Triage Protocol; N17.9-Acute kidney failure,  unspecified     COMPARISON: 8/23/2017     FINDINGS:  Portable view of the chest demonstrates the lungs to be  grossly clear. There is no evidence of effusion, pneumothorax or other  significant pleural disease. The mediastinum is unremarkable.     The heart size is normal.       Impression:      Unremarkable portable chest.            This report was signed and finalized on 7/27/2024 7:20 AM by Henry Baker MD.       CT Abdomen Pelvis Without Contrast [[id number]] Collected: 07/26/24 1946     Updated: 07/26/24 2008    Narrative:      FINAL REPORT    TECHNIQUE:  Routine axial images through the abdomen and pelvis were  obtained. This study was performed with techniques to keep  radiation doses as low as reasonably achievable (ALARA).  Individualized dose reduction techniques using automated  exposure control or adjustment of mA and/or kV according to the  patient's size were employed.    CLINICAL HISTORY:  difficulty urinating, creatinine of 3    FINDINGS:  Abdomen:  The gallbladder is normal.  The solid abdominal organs  and ureters are unremarkable.  The GI tract is unremarkable,  including the appendix.  Pelvis: " The urinary bladder is normal.  There is no pelvic or abdominal ascites, adenopathy or acute  osseous abnormality.      Impression:      Unremarkable.    Authenticated and Electronically Signed by Randall Peraza M.D. on  07/26/2024 08:07:32 PM    CT Chest Without Contrast Diagnostic [[id number]] Collected: 07/26/24 1945     Updated: 07/26/24 2004    Narrative:      FINAL REPORT    TECHNIQUE:  Routine axial images were obtained from the lung apices to below  the diaphragm without contrast.This study was performed with  techniques to keep radiation dose as low as reasonably  achievable (ALARA).  Individualized dose techniques using  automated exposure controls or adjustment of mA and/or KV  according to the patient size were employed.    CLINICAL HISTORY:  sternal chest pain, rule out pneumonia    FINDINGS:  The lungs are clear. There is no pleural disease, adenopathy, or  significant osseous abnormality.      Impression:      Unremarkable.    Authenticated and Electronically Signed by Randall Peraza M.D. on  07/26/2024 08:04:00 PM                    Discharge Disposition:      Home or Self Care    Discharge Medication:         Discharge Medications        Continue These Medications        Instructions Start Date   acyclovir 800 MG tablet  Commonly known as: ZOVIRAX   800 mg, Oral, 5 Times Daily      methadone 10 MG tablet  Commonly known as: DOLOPHINE   40 mg, Oral, Daily             Stop These Medications      lisinopril 20 MG tablet  Commonly known as: PRINIVIL,ZESTRIL              Discharge Diet:             Follow-up Appointments:      No future appointments.      Test Results Pending at Discharge:      Pending Labs       Order Current Status    Protein / Creatinine Ratio, Urine - Urine, Clean Catch In process               Flavio Krueger MD  07/28/24  08:39 EDT    Time:  Discharge 24 min    Please note that portions of this note were completed with a voice recognition program.

## 2024-07-29 ENCOUNTER — READMISSION MANAGEMENT (OUTPATIENT)
Dept: CALL CENTER | Facility: HOSPITAL | Age: 44
End: 2024-07-29
Payer: COMMERCIAL

## 2024-07-29 ENCOUNTER — TELEPHONE (OUTPATIENT)
Dept: TELEMETRY | Facility: HOSPITAL | Age: 44
End: 2024-07-29
Payer: COMMERCIAL

## 2024-07-30 ENCOUNTER — TRANSITIONAL CARE MANAGEMENT TELEPHONE ENCOUNTER (OUTPATIENT)
Dept: CALL CENTER | Facility: HOSPITAL | Age: 44
End: 2024-07-30
Payer: COMMERCIAL

## 2024-07-30 NOTE — OUTREACH NOTE
Prep Survey      Flowsheet Row Responses   [facility] patient discharged from? [facility]   Is LACE score < 7 ? Yes   Eligibility [facility]   Date of Admission 07/26/24   Date of Discharge 07/28/24   Discharge Disposition Home or Self Care   Discharge diagnosis ZEYAD (acute kidney injury   Does the patient have one of the following disease processes/diagnoses(primary or secondary)? Other   Does the patient have Home health ordered? No   Is there a DME ordered? No   Comments regarding appointments new pcp   Medication alerts for this patient see avs   Prep survey completed? Yes            Charley CALL - Registered Nurse

## 2024-07-30 NOTE — OUTREACH NOTE
Call Center TCM Note      Flowsheet Row Responses   [facility] patient discharged from? [facility]   Does the patient have one of the following disease processes/diagnoses(primary or secondary)? Other   TCM attempt successful? No   Unsuccessful attempts Attempt 2            Gia Brewster RN    7/30/2024, 15:32 EDT

## 2024-07-30 NOTE — OUTREACH NOTE
Call Center TCM Note      Flowsheet Row Responses   [facility] patient discharged from? [facility]   Does the patient have one of the following disease processes/diagnoses(primary or secondary)? Other   TCM attempt successful? No  [No VR]   Unsuccessful attempts Attempt 1            Gia Brewster RN    7/30/2024, 09:34 EDT

## 2024-07-31 ENCOUNTER — TRANSITIONAL CARE MANAGEMENT TELEPHONE ENCOUNTER (OUTPATIENT)
Dept: CALL CENTER | Facility: HOSPITAL | Age: 44
End: 2024-07-31
Payer: COMMERCIAL

## 2024-07-31 NOTE — OUTREACH NOTE
Call Center TCM Note      Flowsheet Row Responses   [facility] patient discharged from? [facility]   Does the patient have one of the following disease processes/diagnoses(primary or secondary)? Other   TCM attempt successful? No   Unsuccessful attempts Attempt 3  [no  PCP verbal release. NEW PATIENT PCP.]   Revoked Reason Other  [attempted to contact x 3, unsuccessful. Revoked per unit policy.]   Comments NEW PCP APPT---8/6/24 at 1:15 PM with Dr. Delonte Calvert.   Does the patient have an appointment with their PCP within 7-14 days of discharge? Yes            Cherelle Garcia RN    7/31/2024, 12:08 EDT

## 2024-11-29 ENCOUNTER — HOSPITAL ENCOUNTER (EMERGENCY)
Facility: CLINIC | Age: 44
Discharge: HOME | End: 2024-11-29
Attending: EMERGENCY MEDICINE | Admitting: EMERGENCY MEDICINE
Payer: COMMERCIAL

## 2024-11-29 VITALS
SYSTOLIC BLOOD PRESSURE: 141 MMHG | DIASTOLIC BLOOD PRESSURE: 97 MMHG | RESPIRATION RATE: 16 BRPM | OXYGEN SATURATION: 100 % | TEMPERATURE: 97 F | HEART RATE: 73 BPM

## 2024-11-29 DIAGNOSIS — R03.0 ELEVATED BLOOD PRESSURE READING: ICD-10-CM

## 2024-11-29 DIAGNOSIS — R42 DIZZINESS: ICD-10-CM

## 2024-11-29 LAB
ANION GAP SERPL CALCULATED.3IONS-SCNC: 11 MMOL/L (ref 7–15)
ATRIAL RATE - MUSE: 64 BPM
BASOPHILS # BLD AUTO: 0.1 10E3/UL (ref 0–0.2)
BASOPHILS NFR BLD AUTO: 1 %
BUN SERPL-MCNC: 21.9 MG/DL (ref 6–20)
CALCIUM SERPL-MCNC: 9.6 MG/DL (ref 8.8–10.4)
CHLORIDE SERPL-SCNC: 103 MMOL/L (ref 98–107)
CREAT SERPL-MCNC: 1.04 MG/DL (ref 0.67–1.17)
DIASTOLIC BLOOD PRESSURE - MUSE: NORMAL MMHG
EGFRCR SERPLBLD CKD-EPI 2021: >90 ML/MIN/1.73M2
EOSINOPHIL # BLD AUTO: 0.1 10E3/UL (ref 0–0.7)
EOSINOPHIL NFR BLD AUTO: 1 %
ERYTHROCYTE [DISTWIDTH] IN BLOOD BY AUTOMATED COUNT: 12 % (ref 10–15)
GLUCOSE SERPL-MCNC: 92 MG/DL (ref 70–99)
HCO3 SERPL-SCNC: 26 MMOL/L (ref 22–29)
HCT VFR BLD AUTO: 43.8 % (ref 40–53)
HGB BLD-MCNC: 15.2 G/DL (ref 13.3–17.7)
IMM GRANULOCYTES # BLD: 0 10E3/UL
IMM GRANULOCYTES NFR BLD: 0 %
INTERPRETATION ECG - MUSE: NORMAL
LYMPHOCYTES # BLD AUTO: 1.8 10E3/UL (ref 0.8–5.3)
LYMPHOCYTES NFR BLD AUTO: 16 %
MCH RBC QN AUTO: 30 PG (ref 26.5–33)
MCHC RBC AUTO-ENTMCNC: 34.7 G/DL (ref 31.5–36.5)
MCV RBC AUTO: 86 FL (ref 78–100)
MONOCYTES # BLD AUTO: 0.8 10E3/UL (ref 0–1.3)
MONOCYTES NFR BLD AUTO: 7 %
NEUTROPHILS # BLD AUTO: 8.6 10E3/UL (ref 1.6–8.3)
NEUTROPHILS NFR BLD AUTO: 75 %
NRBC # BLD AUTO: 0 10E3/UL
NRBC BLD AUTO-RTO: 0 /100
P AXIS - MUSE: 39 DEGREES
PLATELET # BLD AUTO: 299 10E3/UL (ref 150–450)
POTASSIUM SERPL-SCNC: 4.3 MMOL/L (ref 3.4–5.3)
PR INTERVAL - MUSE: 152 MS
QRS DURATION - MUSE: 94 MS
QT - MUSE: 402 MS
QTC - MUSE: 414 MS
R AXIS - MUSE: 23 DEGREES
RBC # BLD AUTO: 5.07 10E6/UL (ref 4.4–5.9)
SODIUM SERPL-SCNC: 140 MMOL/L (ref 135–145)
SYSTOLIC BLOOD PRESSURE - MUSE: NORMAL MMHG
T AXIS - MUSE: 13 DEGREES
TROPONIN T SERPL HS-MCNC: <6 NG/L
VENTRICULAR RATE- MUSE: 64 BPM
WBC # BLD AUTO: 11.5 10E3/UL (ref 4–11)

## 2024-11-29 PROCEDURE — 36415 COLL VENOUS BLD VENIPUNCTURE: CPT | Performed by: EMERGENCY MEDICINE

## 2024-11-29 PROCEDURE — 80048 BASIC METABOLIC PNL TOTAL CA: CPT | Performed by: EMERGENCY MEDICINE

## 2024-11-29 PROCEDURE — 84520 ASSAY OF UREA NITROGEN: CPT | Performed by: EMERGENCY MEDICINE

## 2024-11-29 PROCEDURE — 84132 ASSAY OF SERUM POTASSIUM: CPT | Performed by: EMERGENCY MEDICINE

## 2024-11-29 PROCEDURE — 93005 ELECTROCARDIOGRAM TRACING: CPT

## 2024-11-29 PROCEDURE — 84484 ASSAY OF TROPONIN QUANT: CPT | Performed by: EMERGENCY MEDICINE

## 2024-11-29 PROCEDURE — 99284 EMERGENCY DEPT VISIT MOD MDM: CPT

## 2024-11-29 PROCEDURE — 85025 COMPLETE CBC W/AUTO DIFF WBC: CPT | Performed by: EMERGENCY MEDICINE

## 2024-11-29 ASSESSMENT — ACTIVITIES OF DAILY LIVING (ADL)
ADLS_ACUITY_SCORE: 46

## 2024-11-29 NOTE — ED TRIAGE NOTES
"Pt report he woke up this morning \"feeling off\" and went to gym and when got home felt light headed with feeling a \"panic attack due to the high BP\"    /95 at home     Denies CP some SOB when woke up this morning     Some blurry vision but no HA         "